# Patient Record
Sex: MALE | Race: WHITE | Employment: FULL TIME | URBAN - METROPOLITAN AREA
[De-identification: names, ages, dates, MRNs, and addresses within clinical notes are randomized per-mention and may not be internally consistent; named-entity substitution may affect disease eponyms.]

---

## 2018-10-19 ENCOUNTER — APPOINTMENT (OUTPATIENT)
Dept: GENERAL RADIOLOGY | Age: 38
End: 2018-10-19
Attending: EMERGENCY MEDICINE
Payer: COMMERCIAL

## 2018-10-19 ENCOUNTER — HOSPITAL ENCOUNTER (EMERGENCY)
Age: 38
Discharge: HOME | End: 2018-10-19
Attending: EMERGENCY MEDICINE
Payer: COMMERCIAL

## 2018-10-19 VITALS
BODY MASS INDEX: 39.17 KG/M2 | WEIGHT: 315 LBS | OXYGEN SATURATION: 98 % | HEIGHT: 75 IN | DIASTOLIC BLOOD PRESSURE: 69 MMHG | TEMPERATURE: 99 F | RESPIRATION RATE: 18 BRPM | HEART RATE: 80 BPM | SYSTOLIC BLOOD PRESSURE: 133 MMHG

## 2018-10-19 DIAGNOSIS — S00.83XA CONTUSION OF FACE, INITIAL ENCOUNTER: ICD-10-CM

## 2018-10-19 DIAGNOSIS — S01.81XA FACIAL LACERATION, INITIAL ENCOUNTER: ICD-10-CM

## 2018-10-19 DIAGNOSIS — T07.XXXA MULTIPLE CONTUSIONS: ICD-10-CM
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
## 2018-10-19 PROCEDURE — 73560 X-RAY EXAM OF KNEE 1 OR 2: CPT | Performed by: EMERGENCY MEDICINE

## 2018-10-19 PROCEDURE — 12011 RPR F/E/E/N/L/M 2.5 CM/<: CPT

## 2018-10-19 PROCEDURE — 99283 EMERGENCY DEPT VISIT LOW MDM: CPT
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
## 2020-06-27 ENCOUNTER — OFFICE VISIT (OUTPATIENT)
Dept: FAMILY MEDICINE CLINIC | Facility: CLINIC | Age: 40
End: 2020-06-27
Payer: COMMERCIAL

## 2020-06-27 ENCOUNTER — APPOINTMENT (OUTPATIENT)
Dept: ULTRASOUND IMAGING | Age: 40
End: 2020-06-27
Attending: EMERGENCY MEDICINE
Payer: COMMERCIAL

## 2020-06-27 ENCOUNTER — HOSPITAL ENCOUNTER (EMERGENCY)
Age: 40
Discharge: HOME | End: 2020-06-27
Attending: EMERGENCY MEDICINE
Payer: COMMERCIAL

## 2020-06-27 VITALS
WEIGHT: 315 LBS | HEART RATE: 84 BPM | HEIGHT: 75 IN | BODY MASS INDEX: 39.17 KG/M2 | SYSTOLIC BLOOD PRESSURE: 126 MMHG | DIASTOLIC BLOOD PRESSURE: 89 MMHG | TEMPERATURE: 97 F | OXYGEN SATURATION: 94 %

## 2020-06-27 VITALS
HEART RATE: 100 BPM | HEIGHT: 74 IN | BODY MASS INDEX: 40.43 KG/M2 | TEMPERATURE: 98 F | SYSTOLIC BLOOD PRESSURE: 130 MMHG | OXYGEN SATURATION: 94 % | DIASTOLIC BLOOD PRESSURE: 98 MMHG | RESPIRATION RATE: 24 BRPM | WEIGHT: 315 LBS

## 2020-06-27 DIAGNOSIS — L03.119 CELLULITIS OF LOWER EXTREMITY, UNSPECIFIED LATERALITY: Primary | ICD-10-CM

## 2020-06-27 DIAGNOSIS — Z02.9 ADMINISTRATIVE ENCOUNTER: Primary | ICD-10-CM

## 2020-06-27 PROCEDURE — 99284 EMERGENCY DEPT VISIT MOD MDM: CPT

## 2020-06-27 PROCEDURE — 85025 COMPLETE CBC W/AUTO DIFF WBC: CPT | Performed by: EMERGENCY MEDICINE

## 2020-06-27 PROCEDURE — 36415 COLL VENOUS BLD VENIPUNCTURE: CPT

## 2020-06-27 PROCEDURE — 80053 COMPREHEN METABOLIC PANEL: CPT | Performed by: EMERGENCY MEDICINE

## 2020-06-27 PROCEDURE — 93970 EXTREMITY STUDY: CPT | Performed by: EMERGENCY MEDICINE

## 2020-06-27 RX ORDER — CLINDAMYCIN HYDROCHLORIDE 300 MG/1
300 CAPSULE ORAL 3 TIMES DAILY
Qty: 30 CAPSULE | Refills: 0 | Status: SHIPPED | OUTPATIENT
Start: 2020-06-27 | End: 2020-07-07

## 2020-06-27 NOTE — ED INITIAL ASSESSMENT (HPI)
Per pt, the \"redness has been for a couple of days to my legs\". + redness, swelling to BLE, R > L. + CMS.

## 2020-06-27 NOTE — ED PROVIDER NOTES
Patient Seen in: [facility] In [place]      History   Patient presents with:  Cellulitis    Stated Complaint: LEG REDNESS AND SWELLING FOR PAST \"COUPLE OF DAYS\"    HPI    Patient is a 79-year-old male presents emergency room with a 217.7 kg   SpO2 94%   BMI 61.63 kg/m²         Physical Exam  GENERAL: Well-developed, obese male sitting up breathing easily in no apparent distress. Patient is nontoxic in appearance. HEENT: Head is normocephalic, atraumatic.  Pupils are 4 mm equally rou Abnormality         Status                     ---------                               -----------         ------                     CBC W/ DIFFERENTIAL[[id number]]          Abnormal            Final result                 Please view resul List    START taking these medications    Clindamycin HCl 300 MG Oral Cap  Take 1 capsule (300 mg total) by mouth 3 (three) times daily for 10 days.   Qty: 30 capsule Refills: 0

## 2020-06-27 NOTE — PROGRESS NOTES
CHIEF COMPLAINT:     No chief complaint on file. HPI:   Meli Blevins is a 35year old male who presents with complaints of bilateral lower extremity pain and swelling for the past 3 days. The patient also reports redness and itching.   He feels l Conjunctiva normal.  Cornea clear. Lid margins normal.  No active drainage.   EARS: Right TM normal, no bulging, no retraction, no fluid, bony landmarks normal.  Left TM normal, no bulging, no retraction, no fluid, bony landmarks normal.    NOSE: nostrils

## 2020-07-02 ENCOUNTER — OFFICE VISIT (OUTPATIENT)
Dept: INTERNAL MEDICINE CLINIC | Facility: CLINIC | Age: 40
End: 2020-07-02
Payer: COMMERCIAL

## 2020-07-02 VITALS
SYSTOLIC BLOOD PRESSURE: 124 MMHG | HEART RATE: 80 BPM | BODY MASS INDEX: 39.58 KG/M2 | WEIGHT: 315 LBS | DIASTOLIC BLOOD PRESSURE: 74 MMHG | TEMPERATURE: 97 F | HEIGHT: 74.8 IN | RESPIRATION RATE: 16 BRPM

## 2020-07-02 DIAGNOSIS — R21 RASH: ICD-10-CM

## 2020-07-02 DIAGNOSIS — T78.40XA ALLERGY, INITIAL ENCOUNTER: ICD-10-CM

## 2020-07-02 DIAGNOSIS — E66.01 MORBID OBESITY (HCC): ICD-10-CM

## 2020-07-02 DIAGNOSIS — F17.200 TOBACCO DEPENDENCE: ICD-10-CM

## 2020-07-02 DIAGNOSIS — L03.119 CELLULITIS OF LOWER EXTREMITY, UNSPECIFIED LATERALITY: Primary | ICD-10-CM

## 2020-07-02 PROCEDURE — 99204 OFFICE O/P NEW MOD 45 MIN: CPT | Performed by: INTERNAL MEDICINE

## 2020-07-02 PROCEDURE — 99406 BEHAV CHNG SMOKING 3-10 MIN: CPT | Performed by: INTERNAL MEDICINE

## 2020-07-02 RX ORDER — FUROSEMIDE 40 MG/1
40 TABLET ORAL DAILY PRN
Qty: 5 TABLET | Refills: 0 | Status: SHIPPED | OUTPATIENT
Start: 2020-07-02 | End: 2021-03-23

## 2020-07-02 RX ORDER — CETIRIZINE HYDROCHLORIDE 10 MG/1
10 TABLET ORAL AS NEEDED
COMMUNITY

## 2020-07-02 NOTE — PROGRESS NOTES
[facility]  12/11/1980    Patient presents with:  Establish Care:  Rm 7  ER F/U: [facility] 6/27, Bilat Leg cellulitis, notes improvement  Rash: bilat arms, upper chest, itching, using OTC calamine lotion & \"allergy pill\" w/ some relief      SUBJ Oriented to person, place, and time. No distress. HEENT:  Normocephalic and atraumatic. Cardiovascular: Normal rate, regular rhythm and intact distal pulses. No murmur, rubs or gallops.    Pulmonary/Chest: Effort normal and breath sounds normal. No resp

## 2020-07-08 ENCOUNTER — TELEPHONE (OUTPATIENT)
Dept: INTERNAL MEDICINE CLINIC | Facility: CLINIC | Age: 40
End: 2020-07-08

## 2020-07-08 DIAGNOSIS — Z13.220 SCREENING FOR LIPID DISORDERS: ICD-10-CM

## 2020-07-08 DIAGNOSIS — Z13.29 SCREENING FOR THYROID DISORDER: ICD-10-CM

## 2020-07-08 DIAGNOSIS — Z00.00 ROUTINE GENERAL MEDICAL EXAMINATION AT A HEALTH CARE FACILITY: Primary | ICD-10-CM

## 2020-07-08 NOTE — TELEPHONE ENCOUNTER
TSH w Ref and Lipid ordered for THE [facility] lab per protocol. Pt had CBC and CMP done 6/27/20  Please advise if pt needs repeat testing done prior to upcoming appt.

## 2020-07-08 NOTE — TELEPHONE ENCOUNTER
Orders to [facility]. Pt aware to get labs done no sooner than 2 weeks prior to the appt. Pt aware to fast.  No call back required.   Future Appointments   Date Time Provider Hanane Chicas   8/6/2020  3:20 PM Martell Lehman MD [facility]

## 2020-08-12 ENCOUNTER — APPOINTMENT (OUTPATIENT)
Dept: LAB | Age: 40
End: 2020-08-12
Attending: INTERNAL MEDICINE
Payer: COMMERCIAL

## 2020-08-12 DIAGNOSIS — Z13.29 SCREENING FOR THYROID DISORDER: ICD-10-CM

## 2020-08-12 DIAGNOSIS — Z00.00 ROUTINE GENERAL MEDICAL EXAMINATION AT A HEALTH CARE FACILITY: ICD-10-CM

## 2020-08-12 DIAGNOSIS — Z13.220 SCREENING FOR LIPID DISORDERS: ICD-10-CM

## 2020-08-12 LAB
CHOLEST SMN-MCNC: 220 MG/DL (ref ?–200)
HDLC SERPL-MCNC: 41 MG/DL (ref 40–59)
LDLC SERPL CALC-MCNC: 153 MG/DL (ref ?–100)
NONHDLC SERPL-MCNC: 179 MG/DL (ref ?–130)
PATIENT FASTING Y/N/NP: YES
TRIGL SERPL-MCNC: 132 MG/DL (ref 30–149)
TSI SER-ACNC: 1.05 MIU/ML (ref 0.36–3.74)
VLDLC SERPL CALC-MCNC: 26 MG/DL (ref 0–30)

## 2020-08-12 PROCEDURE — 80061 LIPID PANEL: CPT | Performed by: INTERNAL MEDICINE

## 2020-08-12 PROCEDURE — 84443 ASSAY THYROID STIM HORMONE: CPT | Performed by: INTERNAL MEDICINE

## 2020-08-20 ENCOUNTER — OFFICE VISIT (OUTPATIENT)
Dept: INTERNAL MEDICINE CLINIC | Facility: CLINIC | Age: 40
End: 2020-08-20
Payer: COMMERCIAL

## 2020-08-20 VITALS
SYSTOLIC BLOOD PRESSURE: 132 MMHG | HEIGHT: 74.8 IN | HEART RATE: 82 BPM | RESPIRATION RATE: 18 BRPM | BODY MASS INDEX: 39.58 KG/M2 | WEIGHT: 315 LBS | OXYGEN SATURATION: 92 % | DIASTOLIC BLOOD PRESSURE: 86 MMHG | TEMPERATURE: 97 F

## 2020-08-20 DIAGNOSIS — E66.01 MORBID OBESITY (HCC): ICD-10-CM

## 2020-08-20 DIAGNOSIS — E78.5 DYSLIPIDEMIA: ICD-10-CM

## 2020-08-20 DIAGNOSIS — F17.200 TOBACCO DEPENDENCE: ICD-10-CM

## 2020-08-20 DIAGNOSIS — Z00.00 ROUTINE GENERAL MEDICAL EXAMINATION AT A HEALTH CARE FACILITY: Primary | ICD-10-CM

## 2020-08-20 PROCEDURE — 99406 BEHAV CHNG SMOKING 3-10 MIN: CPT | Performed by: INTERNAL MEDICINE

## 2020-08-20 PROCEDURE — 3079F DIAST BP 80-89 MM HG: CPT | Performed by: INTERNAL MEDICINE

## 2020-08-20 PROCEDURE — 3075F SYST BP GE 130 - 139MM HG: CPT | Performed by: INTERNAL MEDICINE

## 2020-08-20 PROCEDURE — 99396 PREV VISIT EST AGE 40-64: CPT | Performed by: INTERNAL MEDICINE

## 2020-08-20 PROCEDURE — 3008F BODY MASS INDEX DOCD: CPT | Performed by: INTERNAL MEDICINE

## 2020-08-20 NOTE — PROGRESS NOTES
Pillo Saenz  12/11/1980    Patient presents with:  Physical: RG rm 7 Physical      HPI:   Pillo Saenz is a 35year old male who presents for an annual physical examination.     The patient has been in his usual state of health and denies any acu clear to auscultation  CARDIO: RRR without murmur  GI: good BS's,no masses, HSM or tenderness    ASSESSMENT AND PLAN:   Reginaldo Wilson is a 35year old male who presents for an annual physical examination.      Outstanding screening and preventive measur

## 2021-01-25 ENCOUNTER — OFFICE VISIT (OUTPATIENT)
Dept: FAMILY MEDICINE CLINIC | Facility: CLINIC | Age: 41
End: 2021-01-25
Payer: COMMERCIAL

## 2021-01-25 VITALS
RESPIRATION RATE: 14 BRPM | HEIGHT: 75 IN | DIASTOLIC BLOOD PRESSURE: 80 MMHG | BODY MASS INDEX: 39.17 KG/M2 | OXYGEN SATURATION: 97 % | SYSTOLIC BLOOD PRESSURE: 140 MMHG | HEART RATE: 79 BPM | WEIGHT: 315 LBS | TEMPERATURE: 97 F

## 2021-01-25 DIAGNOSIS — Z20.822 SUSPECTED 2019 NOVEL CORONAVIRUS INFECTION: ICD-10-CM

## 2021-01-25 DIAGNOSIS — J01.00 ACUTE NON-RECURRENT MAXILLARY SINUSITIS: Primary | ICD-10-CM

## 2021-01-25 PROCEDURE — 99213 OFFICE O/P EST LOW 20 MIN: CPT | Performed by: NURSE PRACTITIONER

## 2021-01-25 PROCEDURE — 3079F DIAST BP 80-89 MM HG: CPT | Performed by: NURSE PRACTITIONER

## 2021-01-25 PROCEDURE — 3077F SYST BP >= 140 MM HG: CPT | Performed by: NURSE PRACTITIONER

## 2021-01-25 PROCEDURE — 3008F BODY MASS INDEX DOCD: CPT | Performed by: NURSE PRACTITIONER

## 2021-01-25 RX ORDER — AMOXICILLIN 875 MG/1
875 TABLET, COATED ORAL 2 TIMES DAILY
Qty: 20 TABLET | Refills: 0 | Status: SHIPPED | OUTPATIENT
Start: 2021-01-25 | End: 2021-02-04

## 2021-01-25 NOTE — PATIENT INSTRUCTIONS
Self-Care for Sinusitis     Drinking plenty of water can help sinuses drain. Sinusitis can often be managed with self-care. Self-care can keep sinuses moist and make you feel more comfortable. Remember to follow your doctor's instructions closely.  This © 7923-2415 [employer] [address]. [facility], [address] [place]. All rights reserved. This information is not intended as a substitute for professional medical care. Always follow your healthcare professional's instructions.       Coronavir Your local public health authorities make the final decisions about how long quarantine should last, based on local conditions and needs. Follow the recommendations of your local public health department if you need to quarantine.  Options they will conside 8. As much as possible, stay in a specific room and away from other people in your home. Also, you should use a separate bathroom, if available. If you need to be around other people in or outside of the home, wear a facemask.    9. Avoid sharing personal i If you have a fever with cough or shortness of breath but have not been exposed to someone with COVID-19 and have not tested positive for COVID-19, you should also stay home and away from others for a total of 10 days after your symptoms started, and at [employer] · Be symptom-free for at least 14 days*    *Some people will be required to have a repeat COVID-19 test in order to be eligible to donate.  If you’re instructed by Dave Vang that a repeat test is required, please contact the [facility] COVID-19 Nurse Tri

## 2021-01-25 NOTE — PROGRESS NOTES
CHIEF COMPLAINT:   Patient presents with:  Sinus Problem      HPI:   Luciana Self is a 38year old male who presents for cold symptoms for  1  weeks. Pt c/o Teeth pain, nasal congestion, runny nose, sinus pressure.   Symptoms been worsening since onse EARS: TM's grey, no bulging, no retraction, no fluid, bony landmarks visible  NOSE: nostrils patent, clear nasal mucous, nasal mucosa reddened   THROAT: oral mucosa pink, moist. No visible dental caries. Posterior pharynx is mildly erythematous.   NECK: sup Drinking extra fluids helps thin your mucus. This lets it drain from your sinuses more easily. Have a glass of water every hour or two. A humidifier helps in much the same way. Fluids can also offset the drying effects of certain medicines.  If you use a hu [facility] is committed to the safety and well-being of our patients, members, employees, and communities.  As concerns arise about the new strain of coronavirus that causes COVID-19, [facility] is here to provide community members r ? After day 7 from date of last exposure with a negative test result (test must occur on day 5 or later)  After stopping quarantine, you should  ? Watch for symptoms until 14 days after exposure.   ? If you have symptoms, immediately self-isolate and contac If you are awaiting test results or are confirmed positive for COVID -19, and your symptoms worsen at home with symptoms such as: extreme weakness, difficult breathing, or unrelenting fevers greater than 100.4 degrees Fahrenheit, you should contact your he Please call your primary care provider within 2 days of your discharge to arrange for a telehealth follow-up.  CDC does not recommend repeat testing after a positive test.  Convalescent Plasma Donation Program  [address], in conjunction with [place] Centers for Disease Control & Prevention (CDC)  10 things you can do to manage your health at home, Starla.nl. pdf  [email or web address]at

## 2021-01-27 LAB — SARS-COV-2 RNA RESP QL NAA+PROBE: NOT DETECTED

## 2021-03-23 ENCOUNTER — OFFICE VISIT (OUTPATIENT)
Dept: INTERNAL MEDICINE CLINIC | Facility: CLINIC | Age: 41
End: 2021-03-23
Payer: COMMERCIAL

## 2021-03-23 VITALS
HEIGHT: 75 IN | SYSTOLIC BLOOD PRESSURE: 124 MMHG | DIASTOLIC BLOOD PRESSURE: 74 MMHG | TEMPERATURE: 98 F | BODY MASS INDEX: 39.17 KG/M2 | HEART RATE: 60 BPM | WEIGHT: 315 LBS

## 2021-03-23 DIAGNOSIS — L98.9 SKIN LESION OF FACE: Primary | ICD-10-CM

## 2021-03-23 DIAGNOSIS — F17.200 TOBACCO DEPENDENCE: ICD-10-CM

## 2021-03-23 PROCEDURE — 3074F SYST BP LT 130 MM HG: CPT | Performed by: INTERNAL MEDICINE

## 2021-03-23 PROCEDURE — 99213 OFFICE O/P EST LOW 20 MIN: CPT | Performed by: INTERNAL MEDICINE

## 2021-03-23 PROCEDURE — 3078F DIAST BP <80 MM HG: CPT | Performed by: INTERNAL MEDICINE

## 2021-03-23 PROCEDURE — 3008F BODY MASS INDEX DOCD: CPT | Performed by: INTERNAL MEDICINE

## 2021-03-23 NOTE — PROGRESS NOTES
Benjamín Barnes  12/11/1980    Patient presents with:  Erectile Dysfuntion: AJ rm 6 ED issues x 2 weeks  Growth: growth on chin in beard x 1 year      Gelacio Boateng is a 38year old male who presents with a skin lesion.     The patient de on left medial chin that is clean, dry, and intact, without erythema or drainage. Psychiatric: Normal mood and affect. ASSESSMENT AND PLAN:   Smith Spencer is a 38year old male who presents with a skin lesion.     Skin lesion of face:  Referred t

## 2021-07-29 ENCOUNTER — HOSPITAL ENCOUNTER (EMERGENCY)
Age: 41
Discharge: HOME | End: 2021-07-29
Attending: EMERGENCY MEDICINE
Payer: OTHER MISCELLANEOUS

## 2021-07-29 VITALS
BODY MASS INDEX: 39.17 KG/M2 | DIASTOLIC BLOOD PRESSURE: 90 MMHG | OXYGEN SATURATION: 99 % | SYSTOLIC BLOOD PRESSURE: 142 MMHG | HEART RATE: 93 BPM | RESPIRATION RATE: 16 BRPM | TEMPERATURE: 98 F | HEIGHT: 75 IN | WEIGHT: 315 LBS

## 2021-07-29 DIAGNOSIS — T14.8XXA PUNCTURE WOUND: Primary | ICD-10-CM

## 2021-07-29 PROCEDURE — 90471 IMMUNIZATION ADMIN: CPT

## 2021-07-29 PROCEDURE — 99283 EMERGENCY DEPT VISIT LOW MDM: CPT

## 2021-07-29 RX ORDER — LEVOFLOXACIN 500 MG/1
500 TABLET, FILM COATED ORAL DAILY
Qty: 7 TABLET | Refills: 0 | Status: SHIPPED | OUTPATIENT
Start: 2021-07-29 | End: 2021-08-05

## 2021-07-29 NOTE — ED PROVIDER NOTES
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)    HPI/Subjective:   HPI    69-year-old male comes to display       While here the patient's tetanus was updated. His wounds were cleaned and dressed. Patient was discharged home with antibiotic coverage at this time and follow-up. MDM      Patient was screened and evaluated during this visit.    As

## 2021-07-30 ENCOUNTER — TELEPHONE (OUTPATIENT)
Dept: INTERNAL MEDICINE CLINIC | Facility: CLINIC | Age: 41
End: 2021-07-30

## 2021-08-03 NOTE — TELEPHONE ENCOUNTER
Patient scheduled.   Future Appointments   Date Time Provider Hanane Chicas   8/5/2021  2:20 PM Brittany Grigsby MD [facility]

## 2021-08-05 NOTE — PROGRESS NOTES
Benjamín Barnes  12/11/1980    Patient presents with:  Hospital F/U: ES rm - 1 c/o Left foot intermittent soreness at a 3/10. Gelacio Boateng is a 38year old male who presents as an ED follow-up.     The patient was in his usual state (Patient not taking: Reported on 8/5/2021 ) 7 tablet 0   • cetirizine 10 MG Oral Tab Take 10 mg by mouth as needed for Allergies. (Patient not taking: Reported on 8/5/2021 )        No Known Allergies   History reviewed. No pertinent past medical history. therapy; behavioral health navigator ordered    Morbid obesity:  Improving  Continue current efforts towards improving dietary and lifestyle management    Tobacco dependence:  Counseled regarding need for cessation. No desire to quit smoking at this time.

## 2021-08-09 ENCOUNTER — TELEPHONE (OUTPATIENT)
Dept: INTERNAL MEDICINE CLINIC | Facility: CLINIC | Age: 41
End: 2021-08-09

## 2021-08-09 NOTE — TELEPHONE ENCOUNTER
We received med record request from [facility], [facility] for [address] [place] [address] Drive 7/29/21-sent to scan stat and scanning

## 2021-12-24 ENCOUNTER — OFFICE VISIT (OUTPATIENT)
Dept: FAMILY MEDICINE CLINIC | Facility: CLINIC | Age: 41
End: 2021-12-24
Payer: COMMERCIAL

## 2021-12-24 VITALS
HEART RATE: 75 BPM | WEIGHT: 315 LBS | RESPIRATION RATE: 16 BRPM | OXYGEN SATURATION: 97 % | TEMPERATURE: 99 F | BODY MASS INDEX: 39.17 KG/M2 | HEIGHT: 75 IN | DIASTOLIC BLOOD PRESSURE: 80 MMHG | SYSTOLIC BLOOD PRESSURE: 136 MMHG

## 2021-12-24 DIAGNOSIS — Z20.822 SUSPECTED 2019 NOVEL CORONAVIRUS INFECTION: Primary | ICD-10-CM

## 2021-12-24 PROCEDURE — 3075F SYST BP GE 130 - 139MM HG: CPT | Performed by: NURSE PRACTITIONER

## 2021-12-24 PROCEDURE — 3008F BODY MASS INDEX DOCD: CPT | Performed by: NURSE PRACTITIONER

## 2021-12-24 PROCEDURE — 3079F DIAST BP 80-89 MM HG: CPT | Performed by: NURSE PRACTITIONER

## 2021-12-24 PROCEDURE — 99213 OFFICE O/P EST LOW 20 MIN: CPT | Performed by: NURSE PRACTITIONER

## 2021-12-24 NOTE — PROGRESS NOTES
CHIEF COMPLAINT:   Patient presents with:  Covid-19 Test      HPI:   Jamarcus Panchal is a 42year old male who presents for upper respiratory symptoms for  1 days. Patient reports body aches, fever 100.3. Symptoms have been persistent since onset.   Treat Karie Williamson is a 42year old male who presents with upper respiratory symptoms that are consistent with    ASSESSMENT:   Suspected 2019 novel coronavirus infection  (primary encounter diagnosis)    PLAN:   covid alinity  Comfort care as described i

## 2022-04-12 ENCOUNTER — OFFICE VISIT (OUTPATIENT)
Dept: INTERNAL MEDICINE CLINIC | Facility: CLINIC | Age: 42
End: 2022-04-12
Payer: COMMERCIAL

## 2022-04-12 VITALS
WEIGHT: 315 LBS | BODY MASS INDEX: 39.17 KG/M2 | TEMPERATURE: 99 F | HEART RATE: 64 BPM | DIASTOLIC BLOOD PRESSURE: 78 MMHG | HEIGHT: 75 IN | RESPIRATION RATE: 18 BRPM | SYSTOLIC BLOOD PRESSURE: 126 MMHG | OXYGEN SATURATION: 97 %

## 2022-04-12 DIAGNOSIS — Z13.228 SCREENING FOR METABOLIC DISORDER: ICD-10-CM

## 2022-04-12 DIAGNOSIS — F17.200 TOBACCO DEPENDENCE: ICD-10-CM

## 2022-04-12 DIAGNOSIS — Z13.29 SCREENING FOR THYROID DISORDER: ICD-10-CM

## 2022-04-12 DIAGNOSIS — E66.01 MORBID OBESITY (HCC): ICD-10-CM

## 2022-04-12 DIAGNOSIS — Z13.220 SCREENING FOR LIPID DISORDERS: ICD-10-CM

## 2022-04-12 DIAGNOSIS — N52.9 ERECTILE DYSFUNCTION, UNSPECIFIED ERECTILE DYSFUNCTION TYPE: Primary | ICD-10-CM

## 2022-04-12 DIAGNOSIS — Z00.00 LABORATORY EXAMINATION ORDERED AS PART OF A COMPLETE PHYSICAL EXAMINATION: ICD-10-CM

## 2022-04-12 DIAGNOSIS — Z13.0 SCREENING FOR BLOOD DISEASE: ICD-10-CM

## 2022-04-12 DIAGNOSIS — F43.23 ADJUSTMENT DISORDER WITH MIXED ANXIETY AND DEPRESSED MOOD: ICD-10-CM

## 2022-04-12 DIAGNOSIS — G47.33 OSA (OBSTRUCTIVE SLEEP APNEA): ICD-10-CM

## 2022-04-12 PROCEDURE — 3074F SYST BP LT 130 MM HG: CPT | Performed by: INTERNAL MEDICINE

## 2022-04-12 PROCEDURE — 3078F DIAST BP <80 MM HG: CPT | Performed by: INTERNAL MEDICINE

## 2022-04-12 PROCEDURE — 99214 OFFICE O/P EST MOD 30 MIN: CPT | Performed by: INTERNAL MEDICINE

## 2022-04-12 PROCEDURE — 3008F BODY MASS INDEX DOCD: CPT | Performed by: INTERNAL MEDICINE

## 2022-04-12 RX ORDER — BUPROPION HYDROCHLORIDE 150 MG/1
150 TABLET ORAL DAILY
Qty: 30 TABLET | Refills: 0 | Status: SHIPPED | OUTPATIENT
Start: 2022-04-12

## 2022-04-12 RX ORDER — SILDENAFIL 50 MG/1
50 TABLET, FILM COATED ORAL
Qty: 8 TABLET | Refills: 0 | Status: SHIPPED | OUTPATIENT
Start: 2022-04-12

## 2022-04-13 ENCOUNTER — TELEPHONE (OUTPATIENT)
Dept: INTERNAL MEDICINE CLINIC | Facility: CLINIC | Age: 42
End: 2022-04-13

## 2022-04-18 ENCOUNTER — LAB ENCOUNTER (OUTPATIENT)
Dept: LAB | Age: 42
End: 2022-04-18
Attending: INTERNAL MEDICINE
Payer: COMMERCIAL

## 2022-04-18 DIAGNOSIS — Z13.228 SCREENING FOR METABOLIC DISORDER: ICD-10-CM

## 2022-04-18 DIAGNOSIS — Z00.00 LABORATORY EXAMINATION ORDERED AS PART OF A COMPLETE PHYSICAL EXAMINATION: ICD-10-CM

## 2022-04-18 DIAGNOSIS — E66.01 MORBID OBESITY (HCC): ICD-10-CM

## 2022-04-18 DIAGNOSIS — N52.9 ERECTILE DYSFUNCTION, UNSPECIFIED ERECTILE DYSFUNCTION TYPE: ICD-10-CM

## 2022-04-18 DIAGNOSIS — Z13.29 SCREENING FOR THYROID DISORDER: ICD-10-CM

## 2022-04-18 DIAGNOSIS — Z13.0 SCREENING FOR BLOOD DISEASE: ICD-10-CM

## 2022-04-18 DIAGNOSIS — Z13.220 SCREENING FOR LIPID DISORDERS: ICD-10-CM

## 2022-04-18 LAB
ALBUMIN SERPL-MCNC: 4 G/DL (ref 3.4–5)
ALBUMIN/GLOB SERPL: 1.2 {RATIO} (ref 1–2)
ALP LIVER SERPL-CCNC: 63 U/L
ALT SERPL-CCNC: 25 U/L
ANION GAP SERPL CALC-SCNC: 3 MMOL/L (ref 0–18)
AST SERPL-CCNC: 16 U/L (ref 15–37)
BASOPHILS # BLD AUTO: 0.17 X10(3) UL (ref 0–0.2)
BASOPHILS NFR BLD AUTO: 1.6 %
BILIRUB SERPL-MCNC: 0.2 MG/DL (ref 0.1–2)
BUN BLD-MCNC: 12 MG/DL (ref 7–18)
CALCIUM BLD-MCNC: 9.5 MG/DL (ref 8.5–10.1)
CHLORIDE SERPL-SCNC: 107 MMOL/L (ref 98–112)
CHOLEST SERPL-MCNC: 218 MG/DL (ref ?–200)
CO2 SERPL-SCNC: 28 MMOL/L (ref 21–32)
CREAT BLD-MCNC: 0.88 MG/DL
EOSINOPHIL # BLD AUTO: 1.17 X10(3) UL (ref 0–0.7)
EOSINOPHIL NFR BLD AUTO: 10.8 %
ERYTHROCYTE [DISTWIDTH] IN BLOOD BY AUTOMATED COUNT: 13.3 %
FASTING PATIENT LIPID ANSWER: YES
FASTING STATUS PATIENT QL REPORTED: YES
GLOBULIN PLAS-MCNC: 3.3 G/DL (ref 2.8–4.4)
GLUCOSE BLD-MCNC: 102 MG/DL (ref 70–99)
HCT VFR BLD AUTO: 50.6 %
HDLC SERPL-MCNC: 46 MG/DL (ref 40–59)
HGB BLD-MCNC: 15.4 G/DL
IMM GRANULOCYTES # BLD AUTO: 0.06 X10(3) UL (ref 0–1)
IMM GRANULOCYTES NFR BLD: 0.6 %
LDLC SERPL CALC-MCNC: 155 MG/DL (ref ?–100)
LYMPHOCYTES # BLD AUTO: 3.01 X10(3) UL (ref 1–4)
LYMPHOCYTES NFR BLD AUTO: 27.7 %
MCH RBC QN AUTO: 28.9 PG (ref 26–34)
MCHC RBC AUTO-ENTMCNC: 30.4 G/DL (ref 31–37)
MCV RBC AUTO: 95.1 FL
MONOCYTES # BLD AUTO: 1.04 X10(3) UL (ref 0.1–1)
MONOCYTES NFR BLD AUTO: 9.6 %
NEUTROPHILS # BLD AUTO: 5.43 X10 (3) UL (ref 1.5–7.7)
NEUTROPHILS # BLD AUTO: 5.43 X10(3) UL (ref 1.5–7.7)
NEUTROPHILS NFR BLD AUTO: 49.7 %
NONHDLC SERPL-MCNC: 172 MG/DL (ref ?–130)
OSMOLALITY SERPL CALC.SUM OF ELEC: 286 MOSM/KG (ref 275–295)
PLATELET # BLD AUTO: 283 10(3)UL (ref 150–450)
POTASSIUM SERPL-SCNC: 5.3 MMOL/L (ref 3.5–5.1)
PROT SERPL-MCNC: 7.3 G/DL (ref 6.4–8.2)
RBC # BLD AUTO: 5.32 X10(6)UL
SODIUM SERPL-SCNC: 138 MMOL/L (ref 136–145)
TESTOST SERPL-MCNC: 254.94 NG/DL
TRIGL SERPL-MCNC: 94 MG/DL (ref 30–149)
TSI SER-ACNC: 0.66 MIU/ML (ref 0.36–3.74)
VLDLC SERPL CALC-MCNC: 18 MG/DL (ref 0–30)
WBC # BLD AUTO: 10.9 X10(3) UL (ref 4–11)

## 2022-04-18 PROCEDURE — 80061 LIPID PANEL: CPT | Performed by: INTERNAL MEDICINE

## 2022-04-18 PROCEDURE — 84403 ASSAY OF TOTAL TESTOSTERONE: CPT | Performed by: INTERNAL MEDICINE

## 2022-04-18 PROCEDURE — 80050 GENERAL HEALTH PANEL: CPT | Performed by: INTERNAL MEDICINE

## 2022-05-03 ENCOUNTER — LAB ENCOUNTER (OUTPATIENT)
Dept: LAB | Age: 42
End: 2022-05-03
Attending: INTERNAL MEDICINE
Payer: COMMERCIAL

## 2022-05-03 DIAGNOSIS — E87.5 HYPERKALEMIA: ICD-10-CM

## 2022-05-03 DIAGNOSIS — E78.5 DYSLIPIDEMIA: ICD-10-CM

## 2022-05-03 LAB
ANION GAP SERPL CALC-SCNC: 5 MMOL/L (ref 0–18)
BUN BLD-MCNC: 10 MG/DL (ref 7–18)
CALCIUM BLD-MCNC: 9.8 MG/DL (ref 8.5–10.1)
CHLORIDE SERPL-SCNC: 106 MMOL/L (ref 98–112)
CO2 SERPL-SCNC: 29 MMOL/L (ref 21–32)
CREAT BLD-MCNC: 0.88 MG/DL
FASTING STATUS PATIENT QL REPORTED: YES
GLUCOSE BLD-MCNC: 110 MG/DL (ref 70–99)
OSMOLALITY SERPL CALC.SUM OF ELEC: 290 MOSM/KG (ref 275–295)
POTASSIUM SERPL-SCNC: 5.2 MMOL/L (ref 3.5–5.1)
SODIUM SERPL-SCNC: 140 MMOL/L (ref 136–145)

## 2022-05-03 PROCEDURE — 80048 BASIC METABOLIC PNL TOTAL CA: CPT | Performed by: INTERNAL MEDICINE

## 2022-05-16 RX ORDER — BUPROPION HYDROCHLORIDE 150 MG/1
TABLET ORAL
Qty: 30 TABLET | Refills: 0 | Status: SHIPPED | OUTPATIENT
Start: 2022-05-16

## 2022-05-16 NOTE — TELEPHONE ENCOUNTER
Last VISIT 04/12/22    Last CPE 08/20/20    Last REFILL 04/12/22 qty 30 w/0 refills done    Last LABS 05/03/22 BMP done    No future appointments. Per PROTOCOL? Not on protocol      Please Approve or Deny.

## 2022-06-13 RX ORDER — BUPROPION HYDROCHLORIDE 150 MG/1
TABLET ORAL
Qty: 30 TABLET | Refills: 0 | Status: SHIPPED | OUTPATIENT
Start: 2022-06-13

## 2022-06-13 NOTE — TELEPHONE ENCOUNTER
Last VISIT 04/12/22    Last CPE 08/20/20    Last REFILL 05/16/22 qty 30 w/0 refills    Last LABS 05/03/22 BMP done    No future appointments. Per PROTOCOL? Not on protocol      Please Approve or Deny.

## 2022-06-23 ENCOUNTER — MED REC SCAN ONLY (OUTPATIENT)
Dept: INTERNAL MEDICINE CLINIC | Facility: CLINIC | Age: 42
End: 2022-06-23

## 2022-07-13 RX ORDER — BUPROPION HYDROCHLORIDE 150 MG/1
TABLET ORAL
Qty: 30 TABLET | Refills: 0 | Status: SHIPPED | OUTPATIENT
Start: 2022-07-13

## 2022-07-13 NOTE — TELEPHONE ENCOUNTER
Last VISIT 04/12/22    Last CPE 08/20/20    Last REFILL 06/13/22 qty 30 w/0 refills   Last LABS 05/03/22 BMP done    No future appointments. Per PROTOCOL? Not on protocol       Please Approve or Deny.

## 2022-08-03 RX ORDER — SILDENAFIL 50 MG/1
50 TABLET, FILM COATED ORAL
Qty: 8 TABLET | Refills: 0 | Status: SHIPPED | OUTPATIENT
Start: 2022-08-03

## 2022-08-15 RX ORDER — BUPROPION HYDROCHLORIDE 150 MG/1
150 TABLET ORAL DAILY
Qty: 90 TABLET | Refills: 0 | Status: SHIPPED | OUTPATIENT
Start: 2022-08-15

## 2022-08-15 NOTE — TELEPHONE ENCOUNTER
Last VISIT - 4/12/22 for ED    Last CPE - No recent physical - 8/20/20    Last REFILL -     BUPROPION  MG Oral Tablet 24 Hr 30 tablet 0 7/13/2022     Last LABS - 4/18/22 tsh, lipid, cmp, cbc    No future appointments. Per PROTOCOL? None    Please Approve or Deny.

## 2022-10-28 RX ORDER — BUPROPION HYDROCHLORIDE 150 MG/1
TABLET ORAL
Qty: 90 TABLET | Refills: 0 | OUTPATIENT
Start: 2022-10-28

## 2022-12-09 RX ORDER — SILDENAFIL 50 MG/1
50 TABLET, FILM COATED ORAL
Qty: 8 TABLET | Refills: 0 | Status: SHIPPED | OUTPATIENT
Start: 2022-12-09

## 2022-12-09 RX ORDER — BUPROPION HYDROCHLORIDE 150 MG/1
150 TABLET ORAL DAILY
Qty: 90 TABLET | Refills: 0 | Status: CANCELLED | OUTPATIENT
Start: 2022-12-09

## 2024-01-08 ENCOUNTER — MED REC SCAN ONLY (OUTPATIENT)
Dept: INTERNAL MEDICINE CLINIC | Facility: CLINIC | Age: 44
End: 2024-01-08

## 2024-09-14 ENCOUNTER — APPOINTMENT (OUTPATIENT)
Dept: GENERAL RADIOLOGY | Age: 44
End: 2024-09-14
Attending: EMERGENCY MEDICINE
Payer: COMMERCIAL

## 2024-09-14 ENCOUNTER — APPOINTMENT (OUTPATIENT)
Dept: ULTRASOUND IMAGING | Age: 44
End: 2024-09-14
Attending: EMERGENCY MEDICINE
Payer: COMMERCIAL

## 2024-09-14 ENCOUNTER — HOSPITAL ENCOUNTER (EMERGENCY)
Age: 44
Discharge: HOME | End: 2024-09-14
Attending: EMERGENCY MEDICINE
Payer: COMMERCIAL

## 2024-09-14 VITALS
TEMPERATURE: 98 F | DIASTOLIC BLOOD PRESSURE: 85 MMHG | HEART RATE: 82 BPM | RESPIRATION RATE: 20 BRPM | OXYGEN SATURATION: 96 % | BODY MASS INDEX: 56 KG/M2 | WEIGHT: 315 LBS | SYSTOLIC BLOOD PRESSURE: 144 MMHG

## 2024-09-14 DIAGNOSIS — R60.0 LEG EDEMA: Primary | ICD-10-CM

## 2024-09-14 LAB
ALBUMIN SERPL-MCNC: 3.7 G/DL (ref 3.4–5)
ALBUMIN/GLOB SERPL: 0.9 {RATIO} (ref 1–2)
ALP LIVER SERPL-CCNC: 137 U/L
ALT SERPL-CCNC: 220 U/L
ANION GAP SERPL CALC-SCNC: 4 MMOL/L (ref 0–18)
AST SERPL-CCNC: 98 U/L (ref 15–37)
BASOPHILS # BLD AUTO: 0.07 X10(3) UL (ref 0–0.2)
BASOPHILS NFR BLD AUTO: 0.6 %
BILIRUB SERPL-MCNC: 0.4 MG/DL (ref 0.1–2)
BUN BLD-MCNC: 15 MG/DL (ref 9–23)
CALCIUM BLD-MCNC: 9.5 MG/DL (ref 8.5–10.1)
CHLORIDE SERPL-SCNC: 103 MMOL/L (ref 98–112)
CO2 SERPL-SCNC: 29 MMOL/L (ref 21–32)
CREAT BLD-MCNC: 0.94 MG/DL
EGFRCR SERPLBLD CKD-EPI 2021: 103 ML/MIN/1.73M2 (ref 60–?)
EOSINOPHIL # BLD AUTO: 0.77 X10(3) UL (ref 0–0.7)
EOSINOPHIL NFR BLD AUTO: 6.2 %
ERYTHROCYTE [DISTWIDTH] IN BLOOD BY AUTOMATED COUNT: 14.4 %
GLOBULIN PLAS-MCNC: 4.2 G/DL (ref 2.8–4.4)
GLUCOSE BLD-MCNC: 99 MG/DL (ref 70–99)
HCT VFR BLD AUTO: 49.2 %
HGB BLD-MCNC: 16 G/DL
IMM GRANULOCYTES # BLD AUTO: 0.13 X10(3) UL (ref 0–1)
IMM GRANULOCYTES NFR BLD: 1 %
LYMPHOCYTES # BLD AUTO: 0.99 X10(3) UL (ref 1–4)
LYMPHOCYTES NFR BLD AUTO: 8 %
MCH RBC QN AUTO: 29.7 PG (ref 26–34)
MCHC RBC AUTO-ENTMCNC: 32.5 G/DL (ref 31–37)
MCV RBC AUTO: 91.3 FL
MONOCYTES # BLD AUTO: 0.8 X10(3) UL (ref 0.1–1)
MONOCYTES NFR BLD AUTO: 6.4 %
NEUTROPHILS # BLD AUTO: 9.69 X10 (3) UL (ref 1.5–7.7)
NEUTROPHILS # BLD AUTO: 9.69 X10(3) UL (ref 1.5–7.7)
NEUTROPHILS NFR BLD AUTO: 77.8 %
NT-PROBNP SERPL-MCNC: 23 PG/ML (ref ?–125)
OSMOLALITY SERPL CALC.SUM OF ELEC: 283 MOSM/KG (ref 275–295)
PLATELET # BLD AUTO: 285 10(3)UL (ref 150–450)
POTASSIUM SERPL-SCNC: 4.5 MMOL/L (ref 3.5–5.1)
PROT SERPL-MCNC: 7.9 G/DL (ref 6.4–8.2)
RBC # BLD AUTO: 5.39 X10(6)UL
SODIUM SERPL-SCNC: 136 MMOL/L (ref 136–145)
TROPONIN I SERPL HS-MCNC: 8 NG/L
TSI SER-ACNC: 1.36 MIU/ML (ref 0.55–4.78)
WBC # BLD AUTO: 12.5 X10(3) UL (ref 4–11)

## 2024-09-14 PROCEDURE — 84484 ASSAY OF TROPONIN QUANT: CPT | Performed by: EMERGENCY MEDICINE

## 2024-09-14 PROCEDURE — 84443 ASSAY THYROID STIM HORMONE: CPT | Performed by: EMERGENCY MEDICINE

## 2024-09-14 PROCEDURE — 93005 ELECTROCARDIOGRAM TRACING: CPT

## 2024-09-14 PROCEDURE — 99285 EMERGENCY DEPT VISIT HI MDM: CPT

## 2024-09-14 PROCEDURE — 93010 ELECTROCARDIOGRAM REPORT: CPT

## 2024-09-14 PROCEDURE — 80053 COMPREHEN METABOLIC PANEL: CPT | Performed by: EMERGENCY MEDICINE

## 2024-09-14 PROCEDURE — 83880 ASSAY OF NATRIURETIC PEPTIDE: CPT | Performed by: EMERGENCY MEDICINE

## 2024-09-14 PROCEDURE — 93971 EXTREMITY STUDY: CPT | Performed by: EMERGENCY MEDICINE

## 2024-09-14 PROCEDURE — 85025 COMPLETE CBC W/AUTO DIFF WBC: CPT | Performed by: EMERGENCY MEDICINE

## 2024-09-14 PROCEDURE — 96374 THER/PROPH/DIAG INJ IV PUSH: CPT

## 2024-09-14 PROCEDURE — 71045 X-RAY EXAM CHEST 1 VIEW: CPT | Performed by: EMERGENCY MEDICINE

## 2024-09-14 RX ORDER — FUROSEMIDE 40 MG
40 TABLET ORAL DAILY
Qty: 14 TABLET | Refills: 0 | Status: SHIPPED | OUTPATIENT
Start: 2024-09-14 | End: 2024-09-28

## 2024-09-14 RX ORDER — CEPHALEXIN 500 MG/1
500 CAPSULE ORAL 4 TIMES DAILY
Qty: 40 CAPSULE | Refills: 0 | Status: SHIPPED | OUTPATIENT
Start: 2024-09-14 | End: 2024-09-24

## 2024-09-14 RX ORDER — FUROSEMIDE 10 MG/ML
40 INJECTION INTRAMUSCULAR; INTRAVENOUS ONCE
Status: COMPLETED | OUTPATIENT
Start: 2024-09-14 | End: 2024-09-14

## 2024-09-14 NOTE — ED QUICK NOTES
Pt reports feeling lightheaded and diaphoretic after blood draw. PT laid back on stretcher, vital retaken. Denies CP or SOB

## 2024-09-14 NOTE — ED PROVIDER NOTES
Patient Seen in: [facility] In [place]      History     Chief Complaint   Patient presents with    Cellulitis     Stated Complaint: BLE swelling and redness over last few days    Subjective:   HPI    Bilateral lower extremity swelling for the past few weeks and redness for the past few days- thopuoght he might have celulitis so he came in  Department for treatment and evaluation he states that he smokes every day he lost his job a couple of years ago and is aware that he is gained some weight he has not seen his primary care doctor in multiple years  Objective:   Past Medical History:    Sleep apnea              History reviewed. No pertinent surgical history.             Social History     Socioeconomic History    Marital status:    Tobacco Use    Smoking status: Former     Current packs/day: 0.50     Types: Cigarettes    Smokeless tobacco: Never   Vaping Use    Vaping status: Every Day    Substances: Nicotine    Devices: Disposable   Substance and Sexual Activity    Alcohol use: Never    Drug use: Never    Sexual activity: Not Currently              Review of Systems    Positive for stated Chief Complaint: Cellulitis    Other systems are as noted in HPI.  Constitutional and vital signs reviewed.      All other systems reviewed and negative except as noted above.    Physical Exam     ED Triage Vitals [09/14/24 1050]   BP (!) 161/88   Pulse 94   Resp 22   Temp 98 °F (36.7 °C)   Temp src Skin   SpO2 94 %   O2 Device None (Room air)       Current Vitals:   Vital Signs  BP: 156/85  Pulse: 88  Resp: 20  Temp: 98 °F (36.7 °C)  Temp src: Skin    Oxygen Therapy  SpO2: 95 %  O2 Device: None (Room air)            Physical Exam    43-year-old sitting on emergency department bed he is in no acute distress he is not short of breath his HEENT exam    ED Course     Labs Reviewed   CBC WITH DIFFERENTIAL WITH PLATELET - Abnormal; Notable for the following components:       Result Value    WBC 12.5 (*)      Neutrophil Absolute Prelim 9.69 (*)     Neutrophil Absolute 9.69 (*)     Lymphocyte Absolute 0.99 (*)     Eosinophil Absolute 0.77 (*)     All other components within normal limits   COMP METABOLIC PANEL (14) - Abnormal; Notable for the following components:    AST 98 (*)      (*)     Alkaline Phosphatase 137 (*)     A/G Ratio 0.9 (*)     All other components within normal limits   PRO BETA NATRIURETIC PEPTIDE - Normal   TSH W REFLEX TO FREE T4   TROPONIN I HIGH SENSITIVITY   RAINBOW DRAW LIGHT GREEN     EKG    Rate, intervals and axes as noted on EKG Report.  Rate: ***  Rhythm: {EDW ED RHYTHM:849356}  Reading: ***              ED Course as of 09/14/24 1503  ------------------------------------------------------------  Time: 09/14 1501  Comment: X-ray is being interpreted as pulmonary view sinus congestion but patient has a body habitus that would make a portable chest x-ray incredibly difficult to interpret and in addition to that he is really not short of breath.  BNP is flat though that could be falsely low secondary to the patient's morbid obesity troponin was ordered but I reviewed an EKG there are no significant Q waves on this it is a benign EKG.  I do not think that this patient is in heart failure.  He has urinated multiple times in the emergency department his blood pressure is slowly coming down I absolutely think he needs to follow-up with his primary care physician to get his blood pressure under better control, I am going to put the patient on Lasix to help with the leg swelling and some Keflex because while I do not think he has significant cellulitis I cannot rule out that he has a bleed this is the start of it on the right lower extremity which has erythema but part of me thinks that it is more hyperemia than truly erythema     ***         MDM      ***                                   Medical Decision Making      Disposition and Plan     Clinical Impression:  1. Leg edema          Disposition:  There is no disposition on file for this visit.  There is no disposition time on file for this visit.    Follow-up:  Daljit Lima MD  [address] [place] [id number]  [phone]    Schedule an appointment as soon as possible for a visit            Medications Prescribed:  Current Discharge Medication List        START taking these medications    Details   cephALEXin 500 MG Oral Cap Take 1 capsule (500 mg total) by mouth 4 (four) times daily for 10 days.  Qty: 40 capsule, Refills: 0      furosemide 40 MG Oral Tab Take 1 tablet (40 mg total) by mouth daily for 14 days.  Qty: 14 tablet, Refills: 0                                             but I individually interpreted that myself, I do not actually think that her represent CHF I think it is because of the patient's body habitus that it looks like he has vascular congestion now his BNP is normal but he is morbidly obese which can give you false reading.  I absolutely think that the patient needs to follow-up with his primary care physician.  Thyroid testing done to rule out significant hypothyroidism was benign.  Do suspect that the leg swelling is probably secondary to the significant morbid obesity.  Discussed this with the patient.  He is going to follow-up with Dr. Lima as I encouraged him to he will be discharged home on antibiotics and diuretics.    Life-threatening emergencies were evaluated and ruled out.                               Medical Decision Making      Disposition and Plan     Clinical Impression:  1. Leg edema         Disposition:  Discharge  9/14/2024  3:42 pm    Follow-up:  Daljit Lima MD  [address] [address]  [phone]    Schedule an appointment as soon as possible for a visit            Medications Prescribed:  Discharge Medication List as of 9/14/2024  3:43 PM        START taking these medications    Details   cephALEXin 500 MG Oral Cap Take 1 capsule (500 mg total) by mouth 4 (four) times daily for 10 days., Normal, Disp-40 capsule, R-0      furosemide 40 MG Oral Tab Take 1 tablet (40 mg total) by mouth daily for 14 days., Normal, Disp-14 tablet, R-0

## 2024-09-14 NOTE — ED INITIAL ASSESSMENT (HPI)
BLE swelling and redness over last 2 days.  Hx of cellulitis.  Denies numbness/tingling/fever/chills

## 2024-09-15 LAB
ATRIAL RATE: 84 BPM
P AXIS: 5 DEGREES
P-R INTERVAL: 134 MS
Q-T INTERVAL: 348 MS
QRS DURATION: 98 MS
QTC CALCULATION (BEZET): 411 MS
R AXIS: 35 DEGREES
T AXIS: 43 DEGREES
VENTRICULAR RATE: 84 BPM

## 2024-09-19 ENCOUNTER — LAB ENCOUNTER (OUTPATIENT)
Dept: LAB | Age: 44
End: 2024-09-19
Payer: COMMERCIAL

## 2024-09-19 ENCOUNTER — OFFICE VISIT (OUTPATIENT)
Dept: INTERNAL MEDICINE CLINIC | Facility: CLINIC | Age: 44
End: 2024-09-19
Payer: COMMERCIAL

## 2024-09-19 VITALS
DIASTOLIC BLOOD PRESSURE: 84 MMHG | HEART RATE: 79 BPM | HEIGHT: 75 IN | WEIGHT: 315 LBS | OXYGEN SATURATION: 95 % | BODY MASS INDEX: 39.17 KG/M2 | SYSTOLIC BLOOD PRESSURE: 144 MMHG

## 2024-09-19 DIAGNOSIS — Z13.0 SCREENING FOR DISORDER OF BLOOD AND BLOOD-FORMING ORGANS: ICD-10-CM

## 2024-09-19 DIAGNOSIS — Z13.220 SCREENING FOR LIPID DISORDERS: ICD-10-CM

## 2024-09-19 DIAGNOSIS — R60.0 LOWER EXTREMITY EDEMA: ICD-10-CM

## 2024-09-19 DIAGNOSIS — Z13.228 SCREENING FOR METABOLIC DISORDER: ICD-10-CM

## 2024-09-19 DIAGNOSIS — R79.89 ELEVATED LFTS: ICD-10-CM

## 2024-09-19 DIAGNOSIS — E66.01 CLASS 3 SEVERE OBESITY DUE TO EXCESS CALORIES WITH BODY MASS INDEX (BMI) OF 50.0 TO 59.9 IN ADULT, UNSPECIFIED WHETHER SERIOUS COMORBIDITY PRESENT (HCC): ICD-10-CM

## 2024-09-19 DIAGNOSIS — F41.9 ANXIETY AND DEPRESSION: ICD-10-CM

## 2024-09-19 DIAGNOSIS — L03.119 CELLULITIS OF LOWER EXTREMITY, UNSPECIFIED LATERALITY: Primary | ICD-10-CM

## 2024-09-19 DIAGNOSIS — F32.A ANXIETY AND DEPRESSION: ICD-10-CM

## 2024-09-19 LAB
ALBUMIN SERPL-MCNC: 4.5 G/DL (ref 3.2–4.8)
ALBUMIN/GLOB SERPL: 1.4 {RATIO} (ref 1–2)
ALP LIVER SERPL-CCNC: 90 U/L
ALT SERPL-CCNC: 77 U/L
ANION GAP SERPL CALC-SCNC: 4 MMOL/L (ref 0–18)
AST SERPL-CCNC: 40 U/L (ref ?–34)
BASOPHILS # BLD AUTO: 0.1 X10(3) UL (ref 0–0.2)
BASOPHILS NFR BLD AUTO: 0.9 %
BILIRUB SERPL-MCNC: 0.2 MG/DL (ref 0.3–1.2)
BUN BLD-MCNC: 11 MG/DL (ref 9–23)
CALCIUM BLD-MCNC: 10.1 MG/DL (ref 8.7–10.4)
CHLORIDE SERPL-SCNC: 105 MMOL/L (ref 98–112)
CHOLEST SERPL-MCNC: 171 MG/DL (ref ?–200)
CO2 SERPL-SCNC: 31 MMOL/L (ref 21–32)
CREAT BLD-MCNC: 0.88 MG/DL
EGFRCR SERPLBLD CKD-EPI 2021: 109 ML/MIN/1.73M2 (ref 60–?)
EOSINOPHIL # BLD AUTO: 0.77 X10(3) UL (ref 0–0.7)
EOSINOPHIL NFR BLD AUTO: 7.1 %
ERYTHROCYTE [DISTWIDTH] IN BLOOD BY AUTOMATED COUNT: 14 %
EST. AVERAGE GLUCOSE BLD GHB EST-MCNC: 131 MG/DL (ref 68–126)
FASTING PATIENT LIPID ANSWER: YES
FASTING STATUS PATIENT QL REPORTED: YES
GLOBULIN PLAS-MCNC: 3.2 G/DL (ref 2–3.5)
GLUCOSE BLD-MCNC: 95 MG/DL (ref 70–99)
HBA1C MFR BLD: 6.2 % (ref ?–5.7)
HCT VFR BLD AUTO: 46.2 %
HDLC SERPL-MCNC: 40 MG/DL (ref 40–59)
HGB BLD-MCNC: 14.7 G/DL
IMM GRANULOCYTES # BLD AUTO: 0.15 X10(3) UL (ref 0–1)
IMM GRANULOCYTES NFR BLD: 1.4 %
LDLC SERPL CALC-MCNC: 116 MG/DL (ref ?–100)
LYMPHOCYTES # BLD AUTO: 2.91 X10(3) UL (ref 1–4)
LYMPHOCYTES NFR BLD AUTO: 26.8 %
MCH RBC QN AUTO: 29.4 PG (ref 26–34)
MCHC RBC AUTO-ENTMCNC: 31.8 G/DL (ref 31–37)
MCV RBC AUTO: 92.4 FL
MONOCYTES # BLD AUTO: 1.15 X10(3) UL (ref 0.1–1)
MONOCYTES NFR BLD AUTO: 10.6 %
NEUTROPHILS # BLD AUTO: 5.79 X10 (3) UL (ref 1.5–7.7)
NEUTROPHILS # BLD AUTO: 5.79 X10(3) UL (ref 1.5–7.7)
NEUTROPHILS NFR BLD AUTO: 53.2 %
NONHDLC SERPL-MCNC: 131 MG/DL (ref ?–130)
OSMOLALITY SERPL CALC.SUM OF ELEC: 289 MOSM/KG (ref 275–295)
PLATELET # BLD AUTO: 310 10(3)UL (ref 150–450)
POTASSIUM SERPL-SCNC: 4.6 MMOL/L (ref 3.5–5.1)
PROT SERPL-MCNC: 7.7 G/DL (ref 5.7–8.2)
RBC # BLD AUTO: 5 X10(6)UL
SODIUM SERPL-SCNC: 140 MMOL/L (ref 136–145)
TRIGL SERPL-MCNC: 82 MG/DL (ref 30–149)
VLDLC SERPL CALC-MCNC: 14 MG/DL (ref 0–30)
WBC # BLD AUTO: 10.9 X10(3) UL (ref 4–11)

## 2024-09-19 PROCEDURE — 3008F BODY MASS INDEX DOCD: CPT

## 2024-09-19 PROCEDURE — 80061 LIPID PANEL: CPT

## 2024-09-19 PROCEDURE — 3077F SYST BP >= 140 MM HG: CPT

## 2024-09-19 PROCEDURE — 3079F DIAST BP 80-89 MM HG: CPT

## 2024-09-19 PROCEDURE — 85025 COMPLETE CBC W/AUTO DIFF WBC: CPT

## 2024-09-19 PROCEDURE — 99214 OFFICE O/P EST MOD 30 MIN: CPT

## 2024-09-19 PROCEDURE — 83036 HEMOGLOBIN GLYCOSYLATED A1C: CPT

## 2024-09-19 PROCEDURE — 80053 COMPREHEN METABOLIC PANEL: CPT

## 2024-09-19 RX ORDER — BUPROPION HYDROCHLORIDE 150 MG/1
150 TABLET ORAL DAILY
Qty: 30 TABLET | Refills: 0 | Status: SHIPPED | OUTPATIENT
Start: 2024-09-19

## 2024-09-19 NOTE — PROGRESS NOTES
Young Giang is a 43 year old male.   Chief Complaint   Patient presents with    ER F/U     RM16 AC  ER FOLLOW UP ( red,swollen legs)     HPI:    Patient here today for follow up from ER encounter 9/14/24 for cellulitis and lower extremity edema. Patient had CXR that shows cardiomegaly with vascular congestion. Venous doppler of right lower extremity shows diffuse subcutaneous edema negative for DVT. Labs completed show elevated LFTs with mild leukocytosis. Troponin and BMP. Patient states he had lost his job and has not been to our office in the last few years. Continues to struggle with depression/anxiety. No SI/HI. Weight has increased though states he has been around 400 lb for years. He does not take any medication at this time. Follows with [facility] sleep clinic for SAI.    Allergies:  No Known Allergies   Current Meds:  Current Outpatient Medications   Medication Sig Dispense Refill    cephALEXin 500 MG Oral Cap Take 1 capsule (500 mg total) by mouth 4 (four) times daily for 10 days. 40 capsule 0    furosemide 40 MG Oral Tab Take 1 tablet (40 mg total) by mouth daily for 14 days. 14 tablet 0    Sildenafil Citrate (VIAGRA) 50 MG Oral Tab Take 1 tablet (50 mg total) by mouth daily as needed for Erectile Dysfunction. (Patient not taking: Reported on 9/14/2024) 8 tablet 0    buPROPion  MG Oral Tablet 24 Hr Take 1 tablet (150 mg total) by mouth daily. (Patient not taking: Reported on 9/14/2024) 90 tablet 0        PMH:     Past Medical History:    Allergic rhinitis    Anxiety    Sleep apnea       ROS:   Review of Systems   Constitutional:  Positive for fatigue.   HENT: Negative.     Respiratory: Negative.     Cardiovascular:  Positive for leg swelling.   Gastrointestinal: Negative.    Genitourinary: Negative.    Neurological: Negative.         PHYSICAL EXAM:    /84   Pulse 79   Ht 6' 3\" (1.905 m)   Wt (!) 479 lb (217.3 kg)   SpO2 95%   BMI 59.87 kg/m²   Physical Exam  Constitutional:        General: He is not in acute distress.     Appearance: Normal appearance. He is obese. He is not ill-appearing or toxic-appearing.   Cardiovascular:      Rate and Rhythm: Normal rate.   Pulmonary:      Effort: Pulmonary effort is normal.      Breath sounds: Normal breath sounds.   Musculoskeletal:      Right lower le+ Edema present.      Left lower le+ Edema present.   Skin:     General: Skin is warm and dry.      Findings: Erythema (mild erythema to lower extremitis) present.   Neurological:      Mental Status: He is alert. Mental status is at baseline.       ASSESSMENT/ PLAN:   1. Cellulitis of lower extremity, unspecified laterality  Follow up if symptoms fail to improve.     2. Lower extremity edema  Discussed echo, low sodium diet. Elevate feet as often as possible. Discussed compression stockings   - CARD ECHO 2D DOPPLER (CPT=93306); Future    3. Anxiety and depression  Restart wellbutrin, well tolerated in past. Take at night. Follow up in 4 weeks.  - buPROPion  MG Oral Tablet 24 Hr; Take 1 tablet (150 mg total) by mouth daily.  Dispense: 30 tablet; Refill: 0    4. Class 3 severe obesity due to excess calories with body mass index (BMI) of 50.0 to 59.9 in adult, unspecified whether serious comorbidity present (HCC)  Discussed dietary and lifestyle modifications. Check labs  - Hemoglobin A1C [E]; Future    5. Screening for lipid disorders  - Lipid Panel; Future    6. Screening for disorder of blood and blood-forming organs  - CBC With Differential With Platelet; Future    7. Screening for metabolic disorder  - Comp Metabolic Panel (14); Future        Health Maintenance Due   Topic Date Due    Annual Physical  Never done    Annual Depression Screening  Never done    Tobacco Cessation Counseling  Never done    COVID-19 Vaccine ( season) 2024           Pt indicates understanding and agrees to the plan.     No follow-ups on file.    IDALIA Ontiveros

## 2024-09-30 ENCOUNTER — HOSPITAL ENCOUNTER (OUTPATIENT)
Dept: CV DIAGNOSTICS | Facility: HOSPITAL | Age: 44
Discharge: HOME | End: 2024-09-30
Payer: COMMERCIAL

## 2024-09-30 DIAGNOSIS — R60.0 LOWER EXTREMITY EDEMA: ICD-10-CM

## 2024-09-30 PROCEDURE — 93306 TTE W/DOPPLER COMPLETE: CPT

## 2024-10-16 ENCOUNTER — TELEPHONE (OUTPATIENT)
Dept: INTERNAL MEDICINE CLINIC | Facility: CLINIC | Age: 44
End: 2024-10-16

## 2024-10-16 DIAGNOSIS — F41.9 ANXIETY AND DEPRESSION: ICD-10-CM

## 2024-10-16 DIAGNOSIS — F32.A ANXIETY AND DEPRESSION: ICD-10-CM

## 2024-10-16 RX ORDER — BUPROPION HYDROCHLORIDE 150 MG/1
150 TABLET ORAL DAILY
Qty: 30 TABLET | Refills: 0 | Status: SHIPPED | OUTPATIENT
Start: 2024-10-16

## 2024-10-16 NOTE — TELEPHONE ENCOUNTER
Refill sent. Follow up before this fill runs out to discuss further management. Glad to hear he is feeling better.

## 2024-10-16 NOTE — TELEPHONE ENCOUNTER
Patient is going back to work on 10 24 2024.   He has had his Echo completed and has the US abdomen scheduled for 11 1 2024.    Patient would like to know if he can see you before he goes back to work on 10 24 2024.   He thought he was to be seen after all the testing was completed.   He has about 5 days left of the bupropion left- he states that he is starting to feel better.   He states that his leg is also getting better.

## 2024-10-18 DIAGNOSIS — F32.A ANXIETY AND DEPRESSION: ICD-10-CM

## 2024-10-18 DIAGNOSIS — F41.9 ANXIETY AND DEPRESSION: ICD-10-CM

## 2024-10-21 RX ORDER — BUPROPION HYDROCHLORIDE 150 MG/1
150 TABLET ORAL DAILY
Qty: 30 TABLET | Refills: 0 | OUTPATIENT
Start: 2024-10-21

## 2024-10-21 NOTE — TELEPHONE ENCOUNTER
Bupropion: per patient message dated 10/16/2024: 30 day supply sent to pharmacy and patient is advised to follow up before refill runs out.

## 2024-11-14 DIAGNOSIS — F41.9 ANXIETY AND DEPRESSION: ICD-10-CM

## 2024-11-14 DIAGNOSIS — F32.A ANXIETY AND DEPRESSION: ICD-10-CM

## 2024-11-18 ENCOUNTER — HOSPITAL ENCOUNTER (OUTPATIENT)
Dept: ULTRASOUND IMAGING | Age: 44
Discharge: HOME | End: 2024-11-18
Payer: COMMERCIAL

## 2024-11-18 DIAGNOSIS — R79.89 ELEVATED LFTS: ICD-10-CM

## 2024-11-18 PROCEDURE — 76700 US EXAM ABDOM COMPLETE: CPT

## 2024-11-19 NOTE — TELEPHONE ENCOUNTER
Patient overdue for follow up- see TE 10/16/24 he was instructed to schedule follow up prior to last fill running out. Please schedule and fill will be sent after appointment made.

## 2024-11-19 NOTE — TELEPHONE ENCOUNTER
Please review; protocol failed/ has no protocol    Requested Prescriptions   Pending Prescriptions Disp Refills    BUPROPION  MG Oral Tablet 24 Hr [Pharmacy Med Name: Bupropion Hydrochloride Er (Xl) 24hr 150 Mg Tab Lupi] 30 tablet 0     Sig: TAKE ONE TABLET BY MOUTH ONCE DAILY       Psychiatric Non-Scheduled (Anti-Anxiety) Failed - 11/19/2024  8:16 AM        Failed - Depression Screening completed within the past 12 months        Passed - In person appointment or virtual visit in the past 6 mos or appointment in next 3 mos     Recent Outpatient Visits              2 months ago Cellulitis of lower extremity, unspecified laterality    [facility], [address] [place], [place] [address]Dasha Breanna, APRN    Office Visit    2 years ago Erectile dysfunction, unspecified erectile dysfunction type    [facility], [address] [place], [place] [address]Dasha Amish, MD    Office Visit    2 years ago Suspected 2019 novel coronavirus infection    [facility], [address] [place] [id number], [place] Lolis Caba APRN    Office Visit    3 years ago Adjustment disorder with mixed anxiety and depressed mood    [facility], [address] [place], [place] [address]Dasha Amish, MD    Office Visit    3 years ago Skin lesion of face    [facility], [address] [place], [place] [address]Dasha Amish, MD    Office Visit                         Recent Outpatient Visits              2 months ago Cellulitis of lower extremity, unspecified laterality    [facility], [address] [place], [place] [address]Dasha Breanna, APRN    Office Visit    2 years ago Erectile dysfunction, unspecified erectile dysfunction type    [facility], [address] [place], [place] [address]Dasha Amish, MD    Office Visit    2 years ago Suspected 2019 novel coronavirus infection    [facility], [address] [place] [address], [place] Lolis Caba APRN    Office Visit    3 years ago  Adjustment disorder with mixed anxiety and depressed mood    [facility], [address], Daljit Major MD    Office Visit    3 years ago Skin lesion of face    [facility], [address] Bhatti Amish, MD    Office Visit

## 2024-11-20 DIAGNOSIS — K80.10 CALCULUS OF GALLBLADDER WITH CHOLECYSTITIS WITHOUT BILIARY OBSTRUCTION, UNSPECIFIED CHOLECYSTITIS ACUITY: Primary | ICD-10-CM

## 2024-11-21 ENCOUNTER — OFFICE VISIT (OUTPATIENT)
Dept: INTERNAL MEDICINE CLINIC | Facility: CLINIC | Age: 44
End: 2024-11-21
Payer: COMMERCIAL

## 2024-11-21 ENCOUNTER — TELEPHONE (OUTPATIENT)
Dept: INTERNAL MEDICINE CLINIC | Facility: CLINIC | Age: 44
End: 2024-11-21

## 2024-11-21 VITALS
RESPIRATION RATE: 20 BRPM | WEIGHT: 315 LBS | HEART RATE: 90 BPM | TEMPERATURE: 98 F | DIASTOLIC BLOOD PRESSURE: 98 MMHG | BODY MASS INDEX: 39.17 KG/M2 | HEIGHT: 75 IN | SYSTOLIC BLOOD PRESSURE: 162 MMHG

## 2024-11-21 DIAGNOSIS — E66.01 CLASS 3 SEVERE OBESITY DUE TO EXCESS CALORIES WITH SERIOUS COMORBIDITY AND BODY MASS INDEX (BMI) OF 60.0 TO 69.9 IN ADULT (HCC): ICD-10-CM

## 2024-11-21 DIAGNOSIS — E66.813 CLASS 3 SEVERE OBESITY DUE TO EXCESS CALORIES WITH SERIOUS COMORBIDITY AND BODY MASS INDEX (BMI) OF 60.0 TO 69.9 IN ADULT (HCC): ICD-10-CM

## 2024-11-21 DIAGNOSIS — F41.9 ANXIETY AND DEPRESSION: ICD-10-CM

## 2024-11-21 DIAGNOSIS — F32.A ANXIETY AND DEPRESSION: ICD-10-CM

## 2024-11-21 DIAGNOSIS — I10 HYPERTENSION, UNSPECIFIED TYPE: Primary | ICD-10-CM

## 2024-11-21 PROCEDURE — 3080F DIAST BP >= 90 MM HG: CPT

## 2024-11-21 PROCEDURE — 3077F SYST BP >= 140 MM HG: CPT

## 2024-11-21 PROCEDURE — 3008F BODY MASS INDEX DOCD: CPT

## 2024-11-21 PROCEDURE — 99214 OFFICE O/P EST MOD 30 MIN: CPT

## 2024-11-21 RX ORDER — LOSARTAN POTASSIUM AND HYDROCHLOROTHIAZIDE 12.5; 5 MG/1; MG/1
1 TABLET ORAL DAILY
Qty: 30 TABLET | Refills: 0 | Status: SHIPPED | OUTPATIENT
Start: 2024-11-21 | End: 2024-12-21

## 2024-11-21 NOTE — TELEPHONE ENCOUNTER
Patient to call his insurance to inquire about covered weight loss medications otherwise recommend WLC consult as we discussed in visit today. Metformin an option given he is prediabetic as well if patient would like to try.

## 2024-11-21 NOTE — PROGRESS NOTES
Young Giang is a 43 year old male.   Chief Complaint   Patient presents with    Follow - Up    Medication Follow-Up     Refill on buprion      HPI:    Patient here today for follow up on mood and weight. Patient has gained 17 lbs since our last visit. He would like to discuss options for weight management. Mood improved on wellbutrin 150 mg which he feels is working well. His blood pressure elevated today 162/98 and asymptomatic. Recent echo with no significant abnormalities. Elevated LFT prior on labs with abdominal us completed which showed diffuse hepatic steatosis with hepatomegaly, cholelithiasis without cholecystitis.he has been off work which he as not been motivated to exercise to to manage his weight.     Allergies:  Allergies[1]   Current Meds:  Current Outpatient Medications   Medication Sig Dispense Refill    buPROPion  MG Oral Tablet 24 Hr Take 1 tablet (150 mg total) by mouth daily. 90 tablet 1    losartan-hydroCHLOROthiazide 50-12.5 MG Oral Tab Take 1 tablet by mouth daily. 30 tablet 0    Blood Pressure Monitoring (BLOOD PRESSURE KIT) Does not apply Device 1 Application 2 (two) times daily as needed. 1 each 0    semaglutide-weight management 0.25 MG/0.5ML Subcutaneous Solution Auto-injector Inject 0.5 mL (0.25 mg total) into the skin once a week for 4 doses. 2 mL 0    Sildenafil Citrate (VIAGRA) 50 MG Oral Tab Take 1 tablet (50 mg total) by mouth daily as needed for Erectile Dysfunction. (Patient not taking: Reported on 9/14/2024) 8 tablet 0        PMH:     Past Medical History:    Allergic rhinitis    Anxiety    Sleep apnea       ROS:   Review of Systems   Constitutional: Negative.    HENT: Negative.     Respiratory: Negative.     Cardiovascular: Negative.    Gastrointestinal: Negative.    Musculoskeletal: Negative.    Neurological: Negative.       PHYSICAL EXAM:    BP (!) 162/98   Pulse 90   Temp 98 °F (36.7 °C) (Temporal)   Resp 20   Ht 6' 3\" (1.905 m)   Wt (!) 496 lb (225 kg)    BMI 62.00 kg/m²   Physical Exam  Constitutional:       Appearance: Normal appearance. He is obese.   Cardiovascular:      Heart sounds: Normal heart sounds.   Pulmonary:      Effort: Pulmonary effort is normal.      Breath sounds: Normal breath sounds.   Neurological:      Mental Status: He is alert.   Psychiatric:         Mood and Affect: Mood normal.        ASSESSMENT/ PLAN:   1. Hypertension, unspecified type  Discussed importance of purchasing blood pressure device, monitor BP twice daily and send readings in 1 week or be seen in office for blood pressure follow up. Labs to be completed in 4 weeks prior to next in office follow up.   - losartan-hydroCHLOROthiazide 50-12.5 MG Oral Tab; Take 1 tablet by mouth daily.  Dispense: 30 tablet; Refill: 0  - Blood Pressure Monitoring (BLOOD PRESSURE KIT) Does not apply Device; 1 Application 2 (two) times daily as needed.  Dispense: 1 each; Refill: 0    2. Class 3 severe obesity due to excess calories with serious comorbidity and body mass index (BMI) of 60.0 to 69.9 in adult (HCC)  Discussed side effects and risk of medication. Discussed if not covered by insurance to schedule with [facility] - prediabetic on recent labs. Discussed importance of dietary and lifestyle modifications given BMI 62 with HTN and fatty liver. Follow up in 4 weeks.   - semaglutide-weight management 0.25 MG/0.5ML Subcutaneous Solution Auto-injector; Inject 0.5 mL (0.25 mg total) into the skin once a week for 4 doses.  Dispense: 2 mL; Refill: 0    Advised follow up with Dr. Way r/t recent ultrasound findings. Patient is due for annual physical, advised to schedule    Health Maintenance Due   Topic Date Due    Annual Physical  Never done    Pneumococcal Vaccine: Birth to 64yrs (1 of 2 - PCV) Never done    Annual Depression Screening  Never done    Tobacco Cessation Counseling  Never done    COVID-19 Vaccine (4 - 2024-25 season) 09/01/2024    Influenza Vaccine (1) Never done     Pt indicates understanding  and agrees to the plan.     No follow-ups on file.    IDALIA Ontiveros          [1] No Known Allergies

## 2024-11-21 NOTE — TELEPHONE ENCOUNTER
Wegovy denied for plan exlcusion    Close reason: Other  Payer: [employer]    [phone]  Note from payer: Drug is not covered by plan  View History

## 2024-11-22 RX ORDER — BUPROPION HYDROCHLORIDE 150 MG/1
150 TABLET ORAL DAILY
Qty: 90 TABLET | Refills: 1 | Status: SHIPPED | OUTPATIENT
Start: 2024-11-22 | End: 2024-12-20

## 2024-11-24 PROBLEM — K80.20 CALCULUS OF GALLBLADDER WITHOUT CHOLECYSTITIS WITHOUT OBSTRUCTION: Status: ACTIVE | Noted: 2024-11-24

## 2024-11-24 PROBLEM — K76.0 HEPATIC STEATOSIS: Status: ACTIVE | Noted: 2024-11-24

## 2024-11-25 NOTE — TELEPHONE ENCOUNTER
Medication is a plan exclusion and not a covered benefit for patients plan. Regardless of diagnosis. No questions generated for this:  Closed   11/21/2024 12:23 PM  Close reason: Other   Note from payer: Drug is not covered by plan   Payer: [employer]    [phone]    [phone]  Waiting for Payer Response   11/21/2024 12:23 PM  Sending user: Nata Banerjee APRN   Payer: [employer]    [phone]    [phone]

## 2024-11-27 RX ORDER — BUPROPION HYDROCHLORIDE 150 MG/1
150 TABLET ORAL DAILY
Qty: 30 TABLET | Refills: 0 | OUTPATIENT
Start: 2024-11-27

## 2024-12-13 ENCOUNTER — TELEPHONE (OUTPATIENT)
Dept: FAMILY MEDICINE CLINIC | Facility: CLINIC | Age: 44
End: 2024-12-13

## 2024-12-13 NOTE — TELEPHONE ENCOUNTER
Pt and pt mom calling office to report pt's weight-loss medication has been approved through his pharmacy if he goes through [facility].  Per mom, it is a weight-loss program for pt who will be eligible for participation if a PA is done for pt's Wegovy 0.25mg prescription.      RN advised pt and mom per other encounter PA was denied by pt's insurance.  Mom states [facility] is different; they should have sent all necessary paperwork/requirements for program approval to the office.  She thought this included a document from MD stating why pt would be appropriate for the program (supporting diagnosis codes, labs, etc.).    Medication Quantity Refills Start End   semaglutide-weight management 0.25 MG/0.5ML Subcutaneous Solution Auto-injector 2 mL 0 11/21/2024 12/13/2024   Sig:   Inject 0.5 mL (0.25 mg total) into the skin once a week for 4 doses.     Route:   Subcutaneous         Routing to MA staff- have you received paperwork from [facility] regarding pt's program?    Routing to EM Triage Support for assistance- I am unfamiliar with workflow for this type of request.  Is this a different PA than normal?

## 2024-12-17 NOTE — TELEPHONE ENCOUNTER
Left message for patient informing him that a prior auth can not be done as medication is excluded from his plan. Advised him to schedule with his provider to discuss other options.

## 2024-12-17 NOTE — TELEPHONE ENCOUNTER
Patient would like someone to call [employer] at [phone].for a pre approval for his Weygovy to be filled. He stated he does not need a prior authorization.   Pt reports he was in cardiology today and they listened to his lungs and heard wheezing so they sent him to have a chest xray. Pt reports he has been having a cough with SOB and right lung pain.      Triage Assessment (Adult)       Row Name 01/23/24 1529          Triage Assessment    Airway WDL WDL        Respiratory WDL    Respiratory WDL X;cough     Cough Frequency infrequent

## 2024-12-17 NOTE — TELEPHONE ENCOUNTER
Received call from pt checking on status. Patient said he has already gotten approval though [employer], but needing doctor approval. Patient unsure if form were going to be send, informed him per below message, appears paperwork was going to be sent. Patient will contact insurance and call back with any more info.     Triage support- have you heard of this? I am not familiar. Can you assist?

## 2024-12-17 NOTE — TELEPHONE ENCOUNTER
Received call back from pt. Per pt, he spoke to [facility] [employer] and they told him the easiest way is for someone to call them to answer pre approval questions (phone [phone]), does not believe there is a form.     Triage support- does this sound correct to you?

## 2024-12-20 ENCOUNTER — TELEPHONE (OUTPATIENT)
Dept: INTERNAL MEDICINE CLINIC | Facility: CLINIC | Age: 44
End: 2024-12-20

## 2024-12-20 ENCOUNTER — OFFICE VISIT (OUTPATIENT)
Dept: INTERNAL MEDICINE CLINIC | Facility: CLINIC | Age: 44
End: 2024-12-20
Payer: COMMERCIAL

## 2024-12-20 VITALS
BODY MASS INDEX: 39.17 KG/M2 | SYSTOLIC BLOOD PRESSURE: 130 MMHG | RESPIRATION RATE: 20 BRPM | HEIGHT: 75 IN | WEIGHT: 315 LBS | TEMPERATURE: 98 F | HEART RATE: 90 BPM | OXYGEN SATURATION: 94 % | DIASTOLIC BLOOD PRESSURE: 84 MMHG

## 2024-12-20 DIAGNOSIS — K76.0 HEPATIC STEATOSIS: ICD-10-CM

## 2024-12-20 DIAGNOSIS — F41.9 ANXIETY AND DEPRESSION: ICD-10-CM

## 2024-12-20 DIAGNOSIS — E78.5 DYSLIPIDEMIA: ICD-10-CM

## 2024-12-20 DIAGNOSIS — G47.33 OSA (OBSTRUCTIVE SLEEP APNEA): ICD-10-CM

## 2024-12-20 DIAGNOSIS — E66.01 CLASS 3 SEVERE OBESITY DUE TO EXCESS CALORIES WITH SERIOUS COMORBIDITY AND BODY MASS INDEX (BMI) OF 60.0 TO 69.9 IN ADULT (HCC): ICD-10-CM

## 2024-12-20 DIAGNOSIS — I10 HYPERTENSION, UNSPECIFIED TYPE: ICD-10-CM

## 2024-12-20 DIAGNOSIS — R73.03 PREDIABETES: Primary | ICD-10-CM

## 2024-12-20 DIAGNOSIS — F32.A ANXIETY AND DEPRESSION: ICD-10-CM

## 2024-12-20 DIAGNOSIS — E66.813 CLASS 3 SEVERE OBESITY DUE TO EXCESS CALORIES WITH SERIOUS COMORBIDITY AND BODY MASS INDEX (BMI) OF 60.0 TO 69.9 IN ADULT (HCC): ICD-10-CM

## 2024-12-20 LAB — HEMOGLOBIN A1C: 6.3 % (ref 4.3–5.6)

## 2024-12-20 PROCEDURE — 3008F BODY MASS INDEX DOCD: CPT

## 2024-12-20 PROCEDURE — 3075F SYST BP GE 130 - 139MM HG: CPT

## 2024-12-20 PROCEDURE — 83036 HEMOGLOBIN GLYCOSYLATED A1C: CPT

## 2024-12-20 PROCEDURE — 99214 OFFICE O/P EST MOD 30 MIN: CPT

## 2024-12-20 PROCEDURE — 3079F DIAST BP 80-89 MM HG: CPT

## 2024-12-20 RX ORDER — LOSARTAN POTASSIUM AND HYDROCHLOROTHIAZIDE 12.5; 5 MG/1; MG/1
1 TABLET ORAL DAILY
Qty: 90 TABLET | Refills: 0 | Status: SHIPPED | OUTPATIENT
Start: 2024-12-20

## 2024-12-20 RX ORDER — BUPROPION HYDROCHLORIDE 150 MG/1
150 TABLET ORAL DAILY
Qty: 90 TABLET | Refills: 1 | Status: SHIPPED | OUTPATIENT
Start: 2024-12-20

## 2024-12-20 NOTE — TELEPHONE ENCOUNTER
12/20 Faxed paperwork to [facility] [employer] for PA on [employer].  Paperwork at [employer].

## 2024-12-20 NOTE — TELEPHONE ENCOUNTER
Spoke with Guillermo at [employer] [phone], answered clinical questions, reasons for Wegovy are BMI, SAI, dyslipidemia and prediabetes for weight loss and reduce cardiovascular risk.    Guillermo indicates the medication is 'basically' approved however he is refaxing back to us information which we are to include clinical documentation supporting the use of this medication and faxing back to them for Clinician review PA Dept at [phone].  We should have a determination in 24-72 hours after receipt.  Awaiting fax.    Guillermo indicates the script for 30 days is ok as is then the next script would include titration.    Patient notified we are in the process with [facility] to obtain authorization.  Pt verbalizes understanding.

## 2024-12-20 NOTE — PROGRESS NOTES
Young Giang is a 44 year old male.   Chief Complaint   Patient presents with    Medication Follow-Up     EJ RM 16a- Pt is here for medication f/u     HPI:    Patient here today for follow up on HTN and obesity. He has not been able to get wegovy due to insurance issue, he reports medication needs to be [facility] and a call from our office to pursue PA is needed. Given patient has gained 30 lbs in last 3 months we discussed my team calling them today. Patient is actively working on diet and exercise. He is looking to increase Wellbutrin to 300 mg for better control of anxiety and depress. He denies shortness of breath, chest pain, vision changes.     Allergies:  Allergies[1]   Current Meds:  Current Outpatient Medications   Medication Sig Dispense Refill    buPROPion  MG Oral Tablet 24 Hr Take 1 tablet (150 mg total) by mouth daily. 90 tablet 1    losartan-hydroCHLOROthiazide 50-12.5 MG Oral Tab Take 1 tablet by mouth daily. 30 tablet 0    Blood Pressure Monitoring (BLOOD PRESSURE KIT) Does not apply Device 1 Application 2 (two) times daily as needed. 1 each 0    Sildenafil Citrate (VIAGRA) 50 MG Oral Tab Take 1 tablet (50 mg total) by mouth daily as needed for Erectile Dysfunction. (Patient not taking: Reported on 9/14/2024) 8 tablet 0        PMH:     Past Medical History:    Allergic rhinitis    Anxiety    Sleep apnea       ROS:   Review of Systems   Constitutional: Negative.    Respiratory: Negative.     Cardiovascular: Negative.    Genitourinary: Negative.    Neurological: Negative.             PHYSICAL EXAM:    /84   Pulse 90   Temp 97.5 °F (36.4 °C) (Temporal)   Resp 20   Ht 6' 3\" (1.905 m)   Wt (!) 503 lb 9.6 oz (228.4 kg)   SpO2 94%   BMI 62.95 kg/m²   Physical Exam  Constitutional:       General: He is not in acute distress.     Appearance: Normal appearance. He is obese. He is not toxic-appearing.   Pulmonary:      Effort: Pulmonary effort is normal.      Breath  sounds: Normal breath sounds.   Skin:     General: Skin is warm and dry.   Neurological:      Mental Status: He is alert.          ASSESSMENT/ PLAN:   1. Hypertension, unspecified type  Blood pressure controlled on losartan-hydrochlorothiazide  - losartan-hydroCHLOROthiazide 50-12.5 MG Oral Tab; Take 1 tablet by mouth daily.  Dispense: 90 tablet; Refill: 0  - semaglutide-weight management 0.25 MG/0.5ML Subcutaneous Solution Auto-injector; Inject 0.5 mL (0.25 mg total) into the skin once a week for 4 doses.  Dispense: 2 mL; Refill: 0    2. Prediabetes  - POC Hemoglobin A1C  - semaglutide-weight management 0.25 MG/0.5ML Subcutaneous Solution Auto-injector; Inject 0.5 mL (0.25 mg total) into the skin once a week for 4 doses.  Dispense: 2 mL; Refill: 0    3. Class 3 severe obesity due to excess calories with serious comorbidity and body mass index (BMI) of 60.0 to 69.9 in adult (HCC)  Patient aware of risk related to severe obesity which includes heart attack, stroke. Strict dietary and lifestyle modifications needed. He is working with insurance with health  and following exercise plan to reduce weight. Follow up in 4 weeks after starting wegovy.   - POC Hemoglobin A1C  - semaglutide-weight management 0.25 MG/0.5ML Subcutaneous Solution Auto-injector; Inject 0.5 mL (0.25 mg total) into the skin once a week for 4 doses.  Dispense: 2 mL; Refill: 0    4. Anxiety and depression  Increase to 300 mg nightly and follow up if tolerating well. Return to clinic for follow up in 4 weeks  - buPROPion  MG Oral Tablet 24 Hr; Take 1 tablet (150 mg total) by mouth daily.  Dispense: 90 tablet; Refill: 1    5. Hepatic steatosis  Discussed weight management, diet and exercise   - semaglutide-weight management 0.25 MG/0.5ML Subcutaneous Solution Auto-injector; Inject 0.5 mL (0.25 mg total) into the skin once a week for 4 doses.  Dispense: 2 mL; Refill: 0    6. Dyslipidemia  Continue dietary and lifestyle medications, due for  labs in next 2-3 weeks.   - semaglutide-weight management 0.25 MG/0.5ML Subcutaneous Solution Auto-injector; Inject 0.5 mL (0.25 mg total) into the skin once a week for 4 doses.  Dispense: 2 mL; Refill: 0    7. SAI (obstructive sleep apnea)  Continue current management  - semaglutide-weight management 0.25 MG/0.5ML Subcutaneous Solution Auto-injector; Inject 0.5 mL (0.25 mg total) into the skin once a week for 4 doses.  Dispense: 2 mL; Refill: 0      Health Maintenance Due   Topic Date Due    Colorectal Cancer Screening  Never done    Annual Physical  Never done    Pneumococcal Vaccine: Birth to 64yrs (1 of 2 - PCV) Never done    Annual Depression Screening  Never done    Tobacco Cessation Counseling  Never done    COVID-19 Vaccine (4 - 2024-25 season) 09/01/2024    Influenza Vaccine (1) Never done    HTN: BP Follow-Up  12/21/2024           Pt indicates understanding and agrees to the plan.     No follow-ups on file.    Nata Banerjee, IDALIA          [1] No Known Allergies

## 2025-01-20 ENCOUNTER — PATIENT MESSAGE (OUTPATIENT)
Dept: INTERNAL MEDICINE CLINIC | Facility: CLINIC | Age: 45
End: 2025-01-20

## 2025-01-21 NOTE — TELEPHONE ENCOUNTER
Patient requesting RX updated to 300mg daily     LOV 12/20/24  He is looking to increase Wellbutrin to 300 mg for better control of anxiety and depress. He denies shortness of breath, chest pain, vision changes.       Anxiety and depression  Increase to 300 mg nightly and follow up if tolerating well. Return to clinic for follow up in 4 weeks  - buPROPion  MG Oral Tablet 24 Hr; Take 1 tablet (150 mg total) by mouth daily.  Dispense: 90 tablet; Refill: 1

## 2025-01-22 RX ORDER — BUPROPION HYDROCHLORIDE 300 MG/1
300 TABLET ORAL DAILY
Qty: 30 TABLET | Refills: 0 | Status: SHIPPED | OUTPATIENT
Start: 2025-01-22 | End: 2025-01-27

## 2025-02-19 ENCOUNTER — TELEPHONE (OUTPATIENT)
Dept: INTERNAL MEDICINE CLINIC | Facility: CLINIC | Age: 45
End: 2025-02-19

## 2025-02-19 DIAGNOSIS — G47.33 OSA (OBSTRUCTIVE SLEEP APNEA): ICD-10-CM

## 2025-02-19 DIAGNOSIS — E66.01 MORBID OBESITY (HCC): Primary | ICD-10-CM

## 2025-02-19 DIAGNOSIS — R79.89 ELEVATED LFTS: ICD-10-CM

## 2025-02-19 DIAGNOSIS — E66.01 CLASS 3 SEVERE OBESITY DUE TO EXCESS CALORIES WITH SERIOUS COMORBIDITY AND BODY MASS INDEX (BMI) OF 60.0 TO 69.9 IN ADULT (HCC): ICD-10-CM

## 2025-02-19 DIAGNOSIS — E66.813 CLASS 3 SEVERE OBESITY DUE TO EXCESS CALORIES WITH SERIOUS COMORBIDITY AND BODY MASS INDEX (BMI) OF 60.0 TO 69.9 IN ADULT (HCC): ICD-10-CM

## 2025-02-19 DIAGNOSIS — I10 HYPERTENSION, UNSPECIFIED TYPE: ICD-10-CM

## 2025-02-19 NOTE — TELEPHONE ENCOUNTER
Patient called stating today is his last dose of the 0.5mg  Requesting an increase       Which GLP is the patient on: semaglutide-weight management 0.5 MG/0.5ML   Current dose: 0.5  Patient seeking refill or increase to next dose: increase   How many doses of current dose completed: 4  Side effects, if any:  denies  Current weight / how much weight loss:   Last visit:  1/27/25

## 2025-02-19 NOTE — TELEPHONE ENCOUNTER
RN to pt call, informed of rx and to which pharmacy location, pt appreciative of rx.    Pt states he is currently 492 lbs. Value entered in \"Patient Reported Vitals\" tab.

## 2025-03-20 DIAGNOSIS — E66.01 CLASS 3 SEVERE OBESITY DUE TO EXCESS CALORIES WITH SERIOUS COMORBIDITY AND BODY MASS INDEX (BMI) OF 60.0 TO 69.9 IN ADULT (HCC): ICD-10-CM

## 2025-03-20 DIAGNOSIS — I10 HYPERTENSION, UNSPECIFIED TYPE: ICD-10-CM

## 2025-03-20 DIAGNOSIS — R73.03 PREDIABETES: ICD-10-CM

## 2025-03-20 DIAGNOSIS — E66.813 CLASS 3 SEVERE OBESITY DUE TO EXCESS CALORIES WITH SERIOUS COMORBIDITY AND BODY MASS INDEX (BMI) OF 60.0 TO 69.9 IN ADULT (HCC): ICD-10-CM

## 2025-03-20 DIAGNOSIS — G47.33 OSA (OBSTRUCTIVE SLEEP APNEA): ICD-10-CM

## 2025-03-20 DIAGNOSIS — E66.01 MORBID OBESITY (HCC): Primary | ICD-10-CM

## 2025-03-20 DIAGNOSIS — E78.5 DYSLIPIDEMIA: ICD-10-CM

## 2025-03-20 NOTE — TELEPHONE ENCOUNTER
Please review. Protocol Failed; No Protocol    Patient stated he took his last one yesterday. And ISAÍAS Ontiveros has been bumping him up to the next one. He stated he will need his wegovy filled and he believes its for the next one up.    Pended for next dose of 1.7 mg Wegovy    Please review and advise if appropriate

## 2025-03-20 NOTE — TELEPHONE ENCOUNTER
Please obtain BP and updated weight for patient and update patient reported vitals. Due end of next month for follow up.

## 2025-03-20 NOTE — TELEPHONE ENCOUNTER
Patient stated he took his last one yesterday. And ISAÍAS Ontiveros has been bumping him up to the next one. He stated he will need his wegovy filled and he believes its for the next one up.        Young Giang requesting Medication Refill for:    Medication name and dose (copy and paste from medication list): Medication Detail    Medication Quantity Refills Start End   semaglutide-weight management 1 MG/0.5ML Subcutaneous Solution Auto-injector () 2          If medication is not on medication list - transfer patient to RN queue for triage    Preferred Pharmacy:   [id number] [place], [place] - [address] AT [facility], [phone], [phone] [address] [facility] [address]   Phone: [phone] Fax: [phone]   Hours: Not open 24 hours       LOV: 2025   Last Refill date:   Next Scheduled appointment: No future appointments.

## 2025-04-24 ENCOUNTER — OFFICE VISIT (OUTPATIENT)
Dept: INTERNAL MEDICINE CLINIC | Facility: CLINIC | Age: 45
End: 2025-04-24
Payer: COMMERCIAL

## 2025-04-24 VITALS
SYSTOLIC BLOOD PRESSURE: 134 MMHG | RESPIRATION RATE: 18 BRPM | HEART RATE: 92 BPM | BODY MASS INDEX: 40 KG/M2 | TEMPERATURE: 98 F | WEIGHT: 315 LBS | OXYGEN SATURATION: 96 % | DIASTOLIC BLOOD PRESSURE: 64 MMHG | HEIGHT: 74.41 IN

## 2025-04-24 DIAGNOSIS — I10 HYPERTENSION, UNSPECIFIED TYPE: ICD-10-CM

## 2025-04-24 DIAGNOSIS — E66.813 CLASS 3 SEVERE OBESITY DUE TO EXCESS CALORIES WITH SERIOUS COMORBIDITY AND BODY MASS INDEX (BMI) OF 60.0 TO 69.9 IN ADULT: Primary | ICD-10-CM

## 2025-04-24 DIAGNOSIS — E78.5 DYSLIPIDEMIA: ICD-10-CM

## 2025-04-24 DIAGNOSIS — K76.0 HEPATIC STEATOSIS: ICD-10-CM

## 2025-04-24 DIAGNOSIS — G47.33 OSA (OBSTRUCTIVE SLEEP APNEA): ICD-10-CM

## 2025-04-24 DIAGNOSIS — R73.03 PREDIABETES: ICD-10-CM

## 2025-04-24 PROCEDURE — 3008F BODY MASS INDEX DOCD: CPT

## 2025-04-24 PROCEDURE — 3078F DIAST BP <80 MM HG: CPT

## 2025-04-24 PROCEDURE — G2211 COMPLEX E/M VISIT ADD ON: HCPCS

## 2025-04-24 PROCEDURE — 3075F SYST BP GE 130 - 139MM HG: CPT

## 2025-04-24 PROCEDURE — 99214 OFFICE O/P EST MOD 30 MIN: CPT

## 2025-04-24 RX ORDER — BUPROPION HYDROCHLORIDE 300 MG/1
300 TABLET ORAL DAILY
Qty: 90 TABLET | Refills: 1 | Status: SHIPPED | OUTPATIENT
Start: 2025-04-24

## 2025-04-25 NOTE — PROGRESS NOTES
Young Giang is a 44 year old male.   Chief Complaint   Patient presents with    Weight Management     ES rm - 16a - f/u for Wegovy for weight mangament     History of Present Illness  Patient here today for follow up on weight management medication. Currently on wegovy 1.7 mg dosing and feeling well. He has lost 28 lbs per his weight monitoring nicholas on phone. He states on home scale was 275 lb this morning. Reports feeling well with no new side effects or symptoms. Blood pressure well controlled, reports there are days he forgets to take losartan-hydrochlorothiazide. His mood is better on wellbutrin. Recently started working again.     Allergies:  Allergies[1]   Current Meds:  Current Medications[2]     PMH:   Past Medical History[3]    ROS:   Review of Systems   Constitutional: Negative.    Gastrointestinal: Negative.    Genitourinary: Negative.    Neurological: Negative.         PHYSICAL EXAM:    /64 (BP Location: Left arm, Patient Position: Sitting, Cuff Size: large)   Pulse 92   Temp 98.4 °F (36.9 °C) (Temporal)   Resp 18   Ht 6' 2.41\" (1.89 m)   Wt (!) 484 lb 9.6 oz (219.8 kg)   SpO2 96%   BMI 61.54 kg/m²   Physical Exam  Constitutional:       Appearance: Normal appearance. He is obese.   Cardiovascular:      Rate and Rhythm: Normal rate.      Pulses: Normal pulses.   Pulmonary:      Effort: Pulmonary effort is normal.      Breath sounds: Normal breath sounds.   Skin:     General: Skin is warm and dry.   Neurological:      Mental Status: He is alert and oriented to person, place, and time.   Psychiatric:         Mood and Affect: Mood normal.        Assessment & Plan  1. Class 3 severe obesity due to excess calories with serious comorbidity and body mass index (BMI) of 60.0 to 69.9 in adult  Down 28 lbs on wegovy with combination of dietary modification and lifestyle modification.   - semaglutide-weight management 2.4 MG/0.75ML Subcutaneous Solution Auto-injector; Inject 0.75 mL (2.4 mg total)  into the skin once a week for 4 doses.  Dispense: 3 mL; Refill: 2    2. SAI (obstructive sleep apnea)  Stable, continue current management.   - semaglutide-weight management 2.4 MG/0.75ML Subcutaneous Solution Auto-injector; Inject 0.75 mL (2.4 mg total) into the skin once a week for 4 doses.  Dispense: 3 mL; Refill: 2    3. Hypertension, unspecified type  Well controlled, continue losartan-hydrochlorothiazide and monitor BP.   - semaglutide-weight management 2.4 MG/0.75ML Subcutaneous Solution Auto-injector; Inject 0.75 mL (2.4 mg total) into the skin once a week for 4 doses.  Dispense: 3 mL; Refill: 2    4. Hepatic steatosis  Discussed weight management, and monitoring liver function.   - semaglutide-weight management 2.4 MG/0.75ML Subcutaneous Solution Auto-injector; Inject 0.75 mL (2.4 mg total) into the skin once a week for 4 doses.  Dispense: 3 mL; Refill: 2    5. Dyslipidemia  Discussed dietary and lifestyle modifications. Due for physical. He will complete repeat cmp in 3 months prior to annual physical.   - semaglutide-weight management 2.4 MG/0.75ML Subcutaneous Solution Auto-injector; Inject 0.75 mL (2.4 mg total) into the skin once a week for 4 doses.  Dispense: 3 mL; Refill: 2    6. Prediabetes  Continue dietary and lifestyle modifications.   - semaglutide-weight management 2.4 MG/0.75ML Subcutaneous Solution Auto-injector; Inject 0.75 mL (2.4 mg total) into the skin once a week for 4 doses.  Dispense: 3 mL; Refill: 2       Cpe in 3 months- patient aware.  Health Maintenance Due   Topic Date Due    Colorectal Cancer Screening  Never done    Annual Physical  Never done    COVID-19 Vaccine (4 - 2024-25 season) 09/01/2024            The following individual(s) verbally consented to be recorded using ambient AI listening technology and understand that they can each withdraw their consent to this listening technology at any point by asking the clinician to turn off or pause the recording:    Patient name:  Young Giang    Pt indicates understanding and agrees to the plan.     No follow-ups on file.    IDALIA Ontiveros          [1] No Known Allergies  [2]   Current Outpatient Medications   Medication Sig Dispense Refill    buPROPion  MG Oral Tablet 24 Hr Take 1 tablet (300 mg total) by mouth daily. 90 tablet 1    semaglutide-weight management 2.4 MG/0.75ML Subcutaneous Solution Auto-injector Inject 0.75 mL (2.4 mg total) into the skin once a week for 4 doses. 3 mL 2    losartan-hydroCHLOROthiazide 50-12.5 MG Oral Tab Take 1 tablet by mouth daily. 90 tablet 1   [3]   Past Medical History:   Allergic rhinitis    Anxiety    Sleep apnea

## (undated) NOTE — LETTER
Date & Time: 6/27/2020, 1:34 PM  Patient: Smith Spencer  Encounter Provider(s):    Lissette Olsen MD       To Whom It May Concern:    Honorio Ya was seen and treated in our department on 6/27/2020.  He should not return to work until [employer] 2, 2

## (undated) NOTE — ED AVS SNAPSHOT
Stanley Olmedo   MRN: [id number]    Department:  [facility] Department in [place]   Date of Visit:  10/19/2018           Disclosure     Insurance plans vary and the physician(s) referred by the ER may not be covered by your plan.  Please con tell this physician (or your personal doctor if your instructions are to return to your personal doctor) about any new or lasting problems. The primary care or specialist physician will see patients referred from the [facility] Emergency Department.  Severo Pastures

## (undated) NOTE — LETTER
12/21/20        Kennedi Isaacs [id number]      Dear Abril Davidson,    [id number] [facility] with [facility] records indicate that you have outstanding lab work and or testing that was ordered for you and has not yet been completed:  Orders Placed This Encounter      Blossom Heller